# Patient Record
Sex: FEMALE | Race: WHITE | NOT HISPANIC OR LATINO | Employment: OTHER | URBAN - NONMETROPOLITAN AREA
[De-identification: names, ages, dates, MRNs, and addresses within clinical notes are randomized per-mention and may not be internally consistent; named-entity substitution may affect disease eponyms.]

---

## 2017-04-10 ENCOUNTER — HOSPITAL ENCOUNTER (OUTPATIENT)
Dept: RADIOLOGY | Facility: OTHER | Age: 77
End: 2017-04-10
Attending: FAMILY MEDICINE

## 2017-04-10 DIAGNOSIS — Z12.31 ENCOUNTER FOR SCREENING MAMMOGRAM FOR MALIGNANT NEOPLASM OF BREAST: ICD-10-CM

## 2017-05-24 ENCOUNTER — COMMUNICATION - GICH (OUTPATIENT)
Dept: SURGERY | Facility: OTHER | Age: 77
End: 2017-05-24

## 2017-06-02 ENCOUNTER — COMMUNICATION - GICH (OUTPATIENT)
Dept: SURGERY | Facility: OTHER | Age: 77
End: 2017-06-02

## 2017-06-02 DIAGNOSIS — Z86.0100 HISTORY OF COLONIC POLYPS: ICD-10-CM

## 2017-06-19 ENCOUNTER — HOSPITAL ENCOUNTER (OUTPATIENT)
Dept: SURGERY | Facility: OTHER | Age: 77
Discharge: HOME | End: 2017-06-19
Attending: SURGERY | Admitting: SURGERY

## 2017-06-19 ENCOUNTER — HISTORY (OUTPATIENT)
Dept: SURGERY | Facility: OTHER | Age: 77
End: 2017-06-19

## 2017-06-19 ENCOUNTER — COMMUNICATION - GICH (OUTPATIENT)
Dept: SURGERY | Facility: OTHER | Age: 77
End: 2017-06-19

## 2017-06-19 ENCOUNTER — SURGERY (OUTPATIENT)
Dept: SURGERY | Facility: OTHER | Age: 77
End: 2017-06-19

## 2017-12-28 NOTE — PROGRESS NOTES
Patient Information     Patient Name MRBethany Bernal [id number] Female 1940      Progress Notes by Casper Cutler MD at 2017 12:09 PM     Author:  Casper Cutler MD Service:  (none) Author Type:  Physician     Filed:  2017 12:10 PM Date of Service:  2017 12:09 PM Status:  Signed     :  Casper Cutler MD (Physician)            Screening Questions for the Scheduling of Screening Colonoscopies   (If Colonoscopy is diagnostic, Provider should review the chart before scheduling.)  Are you younger than 50 or older than 80?  NO   Do you take aspirin or fish oil?  ASPRIN  (if yes, tell patient to stop 1 week prior to Colonoscopy)  Do you take warfarin (Coumadin), clopidogrel (Plavix), apixaban (Eliquis), dabigatram (Pradaxa), rivaroxaban (Xarelto) or any blood thinner? NO   Do you use oxygen at home?  NO   Do you have kidney disease? NO   Are you on dialysis? NO   Have you had a stroke or heart attack in the last year? NO  Have you had a stent in your heart or any blood vessel in the last year? NO  Have you had a transplant of any organ? NO  Have you had a colonoscopy or upper endoscopy (EGD) before? YES          When?   - [facility]   Date of scheduled Colonoscopy. 2017  Provider EDUIN   Pharmacy [facility]     Last colonoscopy was 2012 , tubular adenoma polyps were found at that time

## 2017-12-28 NOTE — OR SURGEON
Patient Information     Patient Name MRN Bethany Vazquez [id number] Female 1940      OR Surgeon by Casper Cutler MD at 2017 10:32 AM     Author:  Casper Cutler MD Service:  (none) Author Type:  Physician     Filed:  2017 10:34 AM Date of Service:  2017 10:32 AM Status:  Signed     :  Casper Cutler MD (Physician)            PROCEDURE NOTE    DATE OF SERVICE: 2017    SURGEON: Casper Cutler MD    PRE-OP DIAGNOSIS:  History of Polyps    POST-OP DIAGNOSIS:  Sigmoid Diverticulosis    PROCEDURE: Colonoscopy  ANESTHESIA:  NICO Atwood CRNA    INDICATION FOR THE PROCEDURE: The patient is a 76 y.o. female with h/o polyps . The patient has no complaints  . After explaining the risks to include bleeding, perforation, potential inability to reach the cecum, the patient wished to proceed.    PROCEDURE:After adequate sedation, the patient was in the left lateral decubitus position.  Rectal exam was performed.  There was normal tone and no palpable masses  .  The colonoscope was introduced into the rectum and advanced to the cecum with mild difficulty.  The patient's prep was Excell[facility].  The terminal cecum was reached.  The cecum, ascending, transverse, descending and sigmoid colon was with sigmoid diverticulosis .  The scope was retroflexed in the rectum.  The rectum was unremarkable  .  The scope was straightened and removed.  The patient tolerated the procedure well.     ESTIMATED BLOOD LOSS: none    COMPLICATIONS:  None    TISSUE REMOVED:  no    RECOMMEND:  follow up N/A due to age, fiber supplement and given literature on diverticulosis      Casper Cutler MD FACS

## 2017-12-28 NOTE — OR ANESTHESIA
Patient Information     Patient Name MRN Sex Bethany Duong [id number] Female 1940      OR Anesthesia by Sobia Atwood CRNA at 2017  9:26 AM     Author:  Sobia Atwood CRNA Service:  (none) Author Type:  NURS- Nurse Anesthetist     Filed:  2017  9:26 AM Date of Service:  2017  9:26 AM Status:  Signed     :  Sobia Atwood CRNA (NURS- Nurse Anesthetist)                                                           ANESTHESIA ASSESSMENT    Date: 17 Time: 9:26 AM      Patient:  Bethany Hoyt    Procedure(s) (LRB):  COLONOSCOPY (N/A)    Past Medical History:     Diagnosis  Date     Breast cancer (HCC)     estrogen receptor borderline, progesterone receptor positive, treated with Tamoxifen 10 mg. b.i.d. times five years.  Status post chemotherapy times one and Tamoxifen times five years.        BREAST CANCER, PERSONAL HX 2011     Carpal tunnel syndrome, bilateral      COLONIC POLYPS, ADENOMATOUS, HX OF 3/27/2012     DEGENERATIVE JOINT DISEASE, RIGHT HIP 10/7/2011     Diverticulosis      DIVERTICULOSIS, SIGMOID COLON 3/26/2012     ECZEMA 2011     Finger mass, left 2013     OSTEOPENIA 2011     Pericarditis      S/P right mastectomy        Past Surgical History:      Procedure  Laterality Date     BACK SURGERY       CARPAL TUNNEL RELEASE      Right       CARPAL TUNNEL RELEASE  2010    Left       COLONOSCOPY SCREENING  2002    Diverticulosis       COLONOSCOPY SCREENING  3/26/2012    Follow up 2017       excision finger mass  13    left index finger, [facility]       MASTECTOMY      Right         Family History       Problem   Relation Age of Onset     Other  Father 85     Alzheimer's/Pneumonia       Cancer  Mother      Renal metastized to lung       Cancer-colon  Brother      Anesthesia Problem  No Family History      Blood Disease  No Family History      Stroke  No Family History      Heart Disease  No Family History      Diabetes   No Family History      Cancer-breast  No Family History        Patient Active Problem List     Diagnosis  Code     H/O adenomatous polyp of colon Z86.010     HYPERLIPIDEMIA E78.5     BREAST CANCER, PERSONAL HX Z85.3     OSTEOPENIA M89.9, M94.9     DEGENERATIVE JOINT DISEASE, RIGHT HIP M16.9     Subdeltoid bursitis M75.50     Prepatellar bursitis M70.40       Prescriptions Prior to Admission       Medication  Sig Dispense Refill     ascorbic acid (VITAMIN C) 500 mg tablet Take 500 mg by mouth once daily.       aspirin enteric coated 81 mg tablet Take 1 tablet by mouth once daily with a meal.  0     Cholecalciferol, Vitamin D3, (VITAMIN D-3) 5,000 unit Tab Take  by mouth 2 times daily.       MV,CA,MIN/IRON FUM/FA/LYCO/LUT (COMPLETE MULTI ORAL) Take  by mouth once daily.       Post Mastectomy Bra For personal use. 2 Packet 0     psyllium (METAMUCIL SMOOTH TEXTURE) powd Take 1 tsp by mouth once daily.  0       Allergies:No Known Allergies    Review of Systems:  GERD: No  Chest pain: No  Shortness of breath: No  Recent fever: No  Poor exercise tolerance: No  Bleeding tendency: No  Pregnant: No  Anesthesia Complications: None      History    Smoking Status      Never Smoker   Smokeless Tobacco      Never Used     Social History     Social History        Marital status:       Spouse name: N/A     Number of children:  N/A     Years of education:  N/A     Social History Main Topics       Smoking status: Never Smoker     Smokeless tobacco: Never Used     Alcohol use No     Drug use: No     Sexual activity: Not on file     Other Topics   Concern     Caffeine Concern  Yes     Coffee 1-2 cups/daily      Exercise  Yes     Walking      Seat Belt  Yes     %100      Social History Narrative     She is  (Mark).      She has three children-Walter, Kat, and Analia.      She has three grandchildren.    She is not employed.        Preload 8/23/2013       Physical Examination:  /70  Pulse 67  Temp 97.8  F (36.6   C)  Resp 16  SpO2 100%  Breastfeeding? No There is no height or weight on file to calculate BMI. There is no height or weight on file to calculate BSA.  Dental Condition: Good     Mallampati Score (Airway): II  Cardiovascular: Normal  Pulmonary: Normal  Other: (not recorded)    Recent Labs in [facility]:    No results for input(s): SODIUM, POTASSIUM, CHLORIDE, [id number], ANIONGAP, BUN, CREATININE, BUNCREARATIO, CALCIUM, GLUCOSE, GLUCOSEMETER, KETONES, MAGNESIUM, WBC, HGB, HCT, PLT, ABORH, RHTYPE, PREGURINE, BHCGQL, HCGBETAQUANT, INR in the last 72 hours.          Assessment/Plan:  ASA Class: II  Risk of dental injury discussed: Yes  NPO status confirmed: Yes  Anesthetic Plan: MAC  Risk/Benefit/Alt discussed: Yes  Questions answered: Yes  Emergency Case?: No  Labs/ECG/Radiology Reviewed?: Yes      H&P Reviewed.  Patient Examined.      Provider Electronic Signature:  Sobia Atwood CRNA

## 2017-12-28 NOTE — TELEPHONE ENCOUNTER
Patient Information     Patient Name MRBethany Bernal [id number] Female 1940      Telephone Encounter by Symone Frausto at 2017 10:57 AM     Author:  Symone Frausto Service:  (none) Author Type:  (none)     Filed:  2017 12:48 PM Encounter Date:  2017 Status:  Signed     :  Symone Frausto            Screening Questions for the Scheduling of Screening Colonoscopies   (If Colonoscopy is diagnostic, Provider should review the chart before scheduling.)  Are you younger than 50 or older than 80?  NO   Do you take aspirin or fish oil?  ASPRIN  (if yes, tell patient to stop 1 week prior to Colonoscopy)  Do you take warfarin (Coumadin), clopidogrel (Plavix), apixaban (Eliquis), dabigatram (Pradaxa), rivaroxaban (Xarelto) or any blood thinner? NO   Do you use oxygen at home?  NO   Do you have kidney disease? NO   Are you on dialysis? NO   Have you had a stroke or heart attack in the last year? NO  Have you had a stent in your heart or any blood vessel in the last year? NO  Have you had a transplant of any organ? NO  Have you had a colonoscopy or upper endoscopy (EGD) before? YES          When?   - [facility]   Date of scheduled Colonoscopy. 2017  Provider EDUIN   Pharmacy [facility]     Last colonoscopy was 2012 , tubular adenoma polyps were found at that time.

## 2017-12-28 NOTE — OR ANESTHESIA
Patient Information     Patient Name MRN Sex     Bethany Hoyt [id number] Female 1940      OR Anesthesia by Sobia Atwood CRNA at 2017 10:36 AM     Author:  Sobia Atwood CRNA Service:  (none) Author Type:  NURS- Nurse Anesthetist     Filed:  2017 10:36 AM Date of Service:  2017 10:36 AM Status:  Signed     :  Sobia Atwood CRNA (NURS- Nurse Anesthetist)            Anesthesia Post Operative Care Note    Name: Bethany Hoyt  MRN:   [id number]  :    1940       Procedure Done:  See Surgeon Note        Anesthesia Technique    Anesthetic Type:  MAC       MAC Type:  NC     Oral Trauma:  No    Intraoperative Course   Hemodynamics:  Stable    Ventilation Normal:  Yes Lung Sounds:  Normal      PACU Course    Airway Status:  Extubated     Nondepolarizer Used:       Reversed: N/A   Hemodynamics:  Stable      Hydration: Euvolemic   Temperature:  36.1 - 38.3      Mental Status:  Awake, alert, follows commands   Pain Management:  Adequate   Regional Block:  No   Anesthesia Complications:  None      Vital Signs:  Temp: 97.8  F (36.6  C)  Pulse: 67  BP: 119/70  Resp: 16  SpO2: 100 %                       Active Lines:  Patient Lines/Drains/Airways Status    Active Line     Name: Placement date: Placement time: Site: Days:    PERIPHERAL VAD Left Forearm 17   0945   Forearm   less than 1                Intake & Output:       Labs:  No results for input(s): SD0SEJIYFDL, [id number], PHARTERIAL, MGB1IWXNEBCQ, S4WTSEJFZKAV in the last 24 hours.    No results for input(s): MAGNESIUM in the last 24 hours.    No results for input(s): GLUCOSEMETER in the last 720 hours.        Sobia Atwood CRNA ....................  2017   10:36 AM

## 2017-12-28 NOTE — OR NURSING
Patient Information     Patient Name MRN Bethany Vazquez [id number] Female 1940      OR Nursing by Marbella Trejo RN at 2017 10:03 AM     Author:  Marbella Trejo RN Service:  (none) Author Type:  NURS- Registered Nurse     Filed:  2017 10:17 AM Date of Service:  2017 10:03 AM Status:  Signed     :  Marbella Trejo RN (NURS- Registered Nurse)            Handoff report received from Deysi Gruber RN prior to OR admit

## 2017-12-28 NOTE — OR POSTOP
Patient Information     Patient Name MRN Bethany Vazquez [id number] Female 1940      OR PostOp by Inocencia House RN at 2017 12:23 PM     Author:  Inocencia House RN Service:  (none) Author Type:  NURS- Registered Nurse     Filed:  2017 12:24 PM Date of Service:  2017 12:23 PM Status:  Signed     :  Inocencia House RN (NURS- Registered Nurse)            Discharge Note    Data:  Bethany Hoyt has been discharged home at 1135 via ambulatory accompanied by Registered Nurse.      Action:  Written discharge/follow-up instructions were provided to patient. Prescriptions : None.  Belongings sent with patient. Medications from home sent with patient/family: Not Applicable  Equipment none .     Response:  Patient verbalized understanding of discharge instructions, reason for discharge, and necessary follow-up appointments.

## 2017-12-29 NOTE — H&P
Patient Information     Patient Name MRN Bethany Vazquez [id number] Female 1940      H&P by Casper Cutler MD at 2017  9:28 AM     Author:  Casper Cutler MD Service:  (none) Author Type:  Physician     Filed:  2017  9:29 AM Date of Service:  2017  9:28 AM Status:  Signed     :  Casper Cutler MD (Physician)            History and Physical    CHIEF COMPLAINT / REASON FOR PROCEDURE:  H/o polyps    PERTINENT HISTORY   Patient is a 76 y.o. female who presents today for colonoscopy for h/o polyps.   Last colonoscopy .  Patient has no complaints.    Past Medical History:     Diagnosis  Date     Breast cancer (HCC)     estrogen receptor borderline, progesterone receptor positive, treated with Tamoxifen 10 mg. b.i.d. times five years.  Status post chemotherapy times one and Tamoxifen times five years.        BREAST CANCER, PERSONAL HX 2011     Carpal tunnel syndrome, bilateral      COLONIC POLYPS, ADENOMATOUS, HX OF 3/27/2012     DEGENERATIVE JOINT DISEASE, RIGHT HIP 10/7/2011     Diverticulosis      DIVERTICULOSIS, SIGMOID COLON 3/26/2012     ECZEMA 2011     Finger mass, left 2013     OSTEOPENIA 2011     Pericarditis      S/P right mastectomy      Past Surgical History:      Procedure  Laterality Date     BACK SURGERY       CARPAL TUNNEL RELEASE      Right       CARPAL TUNNEL RELEASE  2010    Left       COLONOSCOPY SCREENING  2002    Diverticulosis       COLONOSCOPY SCREENING  3/26/2012    Follow up 2017       excision finger mass  13    left index finger, [place]       MASTECTOMY      Right         Bleeding tendencies:  No    ALLERGIES/SENSITIVITIES: No Known Allergies     CURRENT MEDICATIONS:    No current facility-administered medications on file prior to encounter.      Current Outpatient Prescriptions on File Prior to Encounter       Medication  Sig Dispense Refill     ascorbic acid (VITAMIN C) 500 mg tablet Take 500 mg by mouth once  daily.       aspirin enteric coated 81 mg tablet Take 1 tablet by mouth once daily with a meal.  0     Cholecalciferol, Vitamin D3, (VITAMIN D-3) 5,000 unit Tab Take  by mouth 2 times daily.       MV,CA,MIN/IRON FUM/FA/LYCO/LUT (COMPLETE MULTI ORAL) Take  by mouth once daily.       Post Mastectomy Bra For personal use. 2 Packet 0     psyllium (METAMUCIL SMOOTH TEXTURE) powd Take 1 tsp by mouth once daily.  0       Physical Exam:   /70  Pulse 67  Temp 97.8  F (36.6  C)  Resp 16  SpO2 100%  Breastfeeding? No   EXAM:  Chest/Respiratory Exam: Normal.  Cardiovascular Exam: Normal.        PLAN:  Colonoscopy, Patient understands risks of bleeding, perforation, potential inability to reach cecum, aspiration and wishes to proceed.  MAC needed for age

## 2018-01-04 NOTE — PROGRESS NOTES
Patient Information     Patient Name MRN Bethany Vazquez [id number] Female 1940      Progress Notes by Sonia Rey at 4/10/2017  1:39 PM     Author:  Sonia Rey Service:  (none) Author Type:  Other Clinical Staff     Filed:  4/10/2017  1:39 PM Date of Service:  4/10/2017  1:39 PM Status:  Signed     :  Sonia Rey (Other Clinical Staff)            Falls Risk Criteria:    Age 65 and older or under age 4        Sensory deficits    Poor vision    Use of ambulatory aides    Impaired judgment    Unable to walk independently    Meets High Risk criteria for falls:  Yes             1.  Do you have dizziness or vertigo?    no                    2.  Do you need help standing or walking?   no                 3.  Have you fallen within the last 6 months?    no           4.  Has the patient been fasting?      no       If any risks are marked Yes, the following interventions are utilized:    Do not leave patient unattended     Assist patient in the dressing room and bathroom    Have ambulatory aides available throughout procedure    Involve patient s family if available

## 2018-01-26 ENCOUNTER — DOCUMENTATION ONLY (OUTPATIENT)
Dept: FAMILY MEDICINE | Facility: OTHER | Age: 78
End: 2018-01-26

## 2018-01-26 PROBLEM — K57.30 DIVERTICULOSIS OF SIGMOID COLON: Status: ACTIVE | Noted: 2017-06-19

## 2018-01-26 PROBLEM — E78.5 HYPERLIPIDEMIA: Status: ACTIVE | Noted: 2018-01-26

## 2018-01-26 RX ORDER — ASPIRIN 81 MG/1
81 TABLET ORAL
COMMUNITY
Start: 2013-09-16 | End: 2019-10-09

## 2018-01-26 RX ORDER — ASCORBIC ACID 500 MG
500 TABLET ORAL DAILY
COMMUNITY

## 2018-06-01 ENCOUNTER — OFFICE VISIT (OUTPATIENT)
Dept: FAMILY MEDICINE | Facility: OTHER | Age: 78
End: 2018-06-01
Attending: NURSE PRACTITIONER
Payer: COMMERCIAL

## 2018-06-01 VITALS
DIASTOLIC BLOOD PRESSURE: 80 MMHG | BODY MASS INDEX: 22.6 KG/M2 | SYSTOLIC BLOOD PRESSURE: 122 MMHG | WEIGHT: 135.8 LBS | HEART RATE: 62 BPM

## 2018-06-01 DIAGNOSIS — N89.8 VAGINAL LESION: Primary | ICD-10-CM

## 2018-06-01 PROCEDURE — 99213 OFFICE O/P EST LOW 20 MIN: CPT | Performed by: NURSE PRACTITIONER

## 2018-06-01 PROCEDURE — G0463 HOSPITAL OUTPT CLINIC VISIT: HCPCS

## 2018-06-01 ASSESSMENT — ANXIETY QUESTIONNAIRES
1. FEELING NERVOUS, ANXIOUS, OR ON EDGE: NOT AT ALL
2. NOT BEING ABLE TO STOP OR CONTROL WORRYING: NOT AT ALL

## 2018-06-01 ASSESSMENT — PAIN SCALES - GENERAL: PAINLEVEL: NO PAIN (0)

## 2018-06-01 NOTE — MR AVS SNAPSHOT
After Visit Summary   6/1/2018    Bethany Hoyt    MRN: [id number]           Patient Information     Date Of Birth          1940        Visit Information        Provider Department      6/1/2018 1:15 PM Karla Goldsmith CNP [facility]        Today's Diagnoses     Vaginal lesion    -  1       Follow-ups after your visit        Who to contact     If you have questions or need follow up information about today's clinic visit or your schedule please contact [facility] directly at [phone].  Normal or non-critical lab and imaging results will be communicated to you by MyChart, letter or phone within 4 business days after the clinic has received the results. If you do not hear from us within 7 days, please contact the clinic through MyChart or phone. If you have a critical or abnormal lab result, we will notify you by phone as soon as possible.  Submit refill requests through [employer] or call your pharmacy and they will forward the refill request to us. Please allow 3 business days for your refill to be completed.          Additional Information About Your Visit        Care EveryWhere ID     This is your Care EveryWhere ID. This could be used by other organizations to access your [facility] medical records  [id number]        Your Vitals Were     Pulse BMI (Body Mass Index)                62 22.6 kg/m2           Blood Pressure from Last 3 Encounters:   06/01/18 122/80   04/04/16 118/74   03/25/15 130/76    Weight from Last 3 Encounters:   06/01/18 135 lb 12.8 oz (61.6 kg)   04/04/16 135 lb (61.2 kg)   03/25/15 142 lb 3.2 oz (64.5 kg)              Today, you had the following     No orders found for display       Primary Care Provider Office Phone # Fax #    Olivier Cheung [phone] [address] [facility] [address]        Equal Access to Services     SCOTT SMITH : roque Santiago  benjamin poemaelliot julienpalnasreen zaratehugo jackyenzo bolden. So [facility] [phone].    ATENCIÓN: Si carter brito, tiene a vargas disposición servicios gratuitos de asistencia lingüística. Llame al [phone].    We comply with applicable federal civil rights laws and [place] laws. We do not discriminate on the basis of race, color, national origin, age, disability, sex, sexual orientation, or gender identity.            Thank you!     Thank you for choosing [facility]  for your care. Our goal is always to provide you with excellent care. Hearing back from our patients is one way we can continue to improve our services. Please take a few minutes to complete the written survey that you may receive in the mail after your visit with us. Thank you!             Your Updated Medication List - Protect others around you: Learn how to safely use, store and throw away your medicines at www.disposemymeds.org.          This list is accurate as of 6/1/18  2:24 PM.  Always use your most recent med list.                   Brand Name Dispense Instructions for use Diagnosis    ascorbic acid 500 MG tablet    VITAMIN C     Take 500 mg by mouth daily        aspirin 81 MG EC tablet      Take 81 mg by mouth daily with food        D 5000 5000 units Tabs   Generic drug:  Cholecalciferol      Take by mouth 2 times daily        PSYLLIUM PO      Take by mouth daily

## 2018-06-01 NOTE — PROGRESS NOTES
SUBJECTIVE:   Bethany Hoyt is a 77 year old female who presents to clinic today for the following health issues:      Vaginal Bleeding   Onset: This past week (a few days ago)    Description:   Duration of bleeding episodes: Has seen a dab of blood only a couple of times  Frequency between periods:  Postmenopausal  Describe bleeding/flow:   Clots: no  Number of pads/hour: 0  Cramping: None    Accompanying Signs & Symptoms:  Weakness: no  Lightheadedness: no  Hot flashes: no  Nosebleeds/Easy bruising: no  Vaginal Discharge: no    History:  No LMP recorded.  Previous normal periods: Postmenopausal  Contraceptive use: NO  Possibility of Pregnancy: no  Any bleeding after intercourse: Not sexually active  Age of first period (menarche): Uncertain  Abnormal PAP Smears: Denies history of abnormal PAPs    Precipitating factors: She is wondering if she could have wiped too hard.    Alleviating factors: none    Therapies Tried and outcome: None    Patient does not think that bleeding is coming vaginally but thinks that there is a tear. She only sees a dab of blood when wiping after voiding and it has only been a few times. She also feels a small lump in the area that she is presuming the bleeding is coming from. She does report a burning discomfort in the area. Denies dysuria, blood in urine, fever, chills. She is not sexually active anymore. She does have a history of breast cancer with right mastectomy. She still has cervix, uterus, fallopian tubes, and ovaries. She has had 3 pregnancy with 3 normal vaginal deliveries but she cannot recall how many required an episiotomy--she just recalls having it done at some point during child birth. Denies diarrhea but does have a history of constipation for which she takes fiber daily for and ensures she drinks plenty of water.      Problem list and histories reviewed & adjusted, as indicated.  Additional history: as documented    Patient Active Problem List   Diagnosis      Personal history of malignant neoplasm of breast     Osteoarthrosis, unspecified whether generalized or localized, pelvic region and thigh     Diverticulosis of sigmoid colon     H/O adenomatous polyp of colon     Hyperlipidemia     Disorder of bone and cartilage     Prepatellar bursitis     Subdeltoid bursitis     Past Surgical History:   Procedure Laterality Date     BACK SURGERY      1959     COLONOSCOPY      5/2002,Diverticulosis     COLONOSCOPY      3/26/2012,Follow up 2017     COLONOSCOPY      06/19/2017,F/U N/A     MASTECTOMY SIMPLE      1990,Right     OTHER SURGICAL HISTORY      9/30/13,[id number],OTHER,left index finger, [facility]     RELEASE CARPAL TUNNEL      2005,Right     RELEASE CARPAL TUNNEL      2010,Left       Social History   Substance Use Topics     Smoking status: Never Smoker     Smokeless tobacco: Never Used     Alcohol use No     Family History   Problem Relation Age of Onset     Other - See Comments Father 85     Alzheimer's/Pneumonia     CANCER Mother      Cancer,Renal metastized to lung     Colon Cancer Brother      Cancer-colon     Anesthesia Reaction No family hx of      Anesthesia Problem     Blood Disease No family hx of      Blood Disease     HEART DISEASE No family hx of      Heart Disease     DIABETES No family hx of      Diabetes     Breast Cancer No family hx of      Cancer-breast         Current Outpatient Prescriptions   Medication Sig Dispense Refill     ascorbic acid (VITAMIN C) 500 MG tablet Take 500 mg by mouth daily       aspirin EC 81 MG EC tablet Take 81 mg by mouth daily with food       Cholecalciferol (D 5000) 5000 UNITS TABS Take by mouth 2 times daily       PSYLLIUM PO Take by mouth daily       No Known Allergies  Recent Labs   Lab Test  04/04/16   1632  05/05/14   1100   LDL  124*  107   HDL  65  74   TRIG  106  67   CR  0.83  0.68   GFRESTBLACK  >60  >60   POTASSIUM  4.0  4.3      BP Readings from Last 3 Encounters:   06/01/18 122/80   04/04/16 118/74   03/25/15 130/76     Wt Readings from Last 3 Encounters:   06/01/18 135 lb 12.8 oz (61.6 kg)   04/04/16 135 lb (61.2 kg)   03/25/15 142 lb 3.2 oz (64.5 kg)                    Reviewed and updated as needed this visit by clinical staff       Reviewed and updated as needed this visit by Provider         ROS:  Constitutional, HEENT, cardiovascular, pulmonary, gi and gu systems are negative, except as otherwise noted.    OBJECTIVE:     /80 (BP Location: Right arm, Patient Position: Sitting, Cuff Size: Adult Large)  Pulse 62  Wt 135 lb 12.8 oz (61.6 kg)  BMI 22.6 kg/m2    GENERAL: healthy, alert and no distress  NECK: no adenopathy, no asymmetry, masses, or scars  RESP: lungs clear to auscultation - no rales, rhonchi or wheezes  CV: regular rate and rhythm, normal S1 S2, no S3 or S4, no murmur, click or rub, no peripheral edema  ABDOMEN: soft, nontender, no hepatosplenomegaly, no masses and bowel sounds normal. No CVA tenderness.   (female): normal female external genitalia except for left lateral episiotomy scar that has a small, raised, flesh colored, firm nodule of scar tissue medially and on distal end of scar tissue nodule is a small about 2 mm oval shaped lesion with pink, shiny granulation tissue, normal urethral meatus, vaginal mucosa pink, moist, smooth, unable to visualize cervix as patient verbalized discomfort with opening and manipulation of speculum while trying to visualize posterior cervix.   NEURO: Normal strength and tone, mentation intact and speech normal  PSYCH: mentation appears normal, affect normal/bright      ASSESSMENT/PLAN:     1. Vaginal lesion  Given patient's history and exam findings the lesion appears to be due to being irritated. Recommended she wipe doing more of a dabbing motion than a wiping motion to help lessen irritation since scar tissue does not stretch well with wiping. Also recommended that she try witch hazel/tucks pads if feeling burning sensation/soreness. Reassured her that there was  no bleeding seen vaginally and that given small dabs of bleeding as she describes it is most likely from the lesion.             FUTURE APPOINTMENTS:       - Follow-up visit in 2-4 weeks if no improvement in symptoms.      Karla Goldsmith CNP  [facility] AND [facility]

## 2018-06-01 NOTE — NURSING NOTE
Patient presents to the clinic for vaginal bleeding. Patient states there isn't a lot and that it started about a week ago, patient is wondering if there might be a tear or lump down in the vaginal area that could be irritated.  Sadi Joaquin ..............6/1/2018 12:50 PM

## 2018-06-26 ENCOUNTER — TRANSFERRED RECORDS (OUTPATIENT)
Dept: HEALTH INFORMATION MANAGEMENT | Facility: OTHER | Age: 78
End: 2018-06-26

## 2018-07-23 NOTE — PROGRESS NOTES
Patient Information     Patient Name  Bethany Hoyt MRN  [id number] Sex  Female   1940      Letter by Casper Cutler MD at      Author:  Casper Cutler MD Service:  (none) Author Type:  (none)    Filed:   Encounter Date:  2017 Status:  (Other)           Bethany Hoyt   [address]  [facility] [address]          2017    Dear Ms. Hoyt:    This letter is in regards to your colonoscopy that was performed by Casper Cutler MD on 17.  Diverticulosis was found during your colonoscopy. Dr. Casper Cutler MD  recommends a high fiber diet. A fiber supplement such as Metamucil, FiberCon, or Citrucel is recommended. Generic forms of these supplements are fine. These are available over the counter.   There were no polyps or other abnormalities found.  No follow up needed due to age being greater than 80 at the next screening interval unless you have problems.  If you have any questions regarding this colonoscopy or your results, please call the Surgery department at [phone]  A copy of your colonoscopy report will be placed in your electronic chart for your primary care provider's review.    Sincerely,        General Surgery    Reviewed and electronically signed by provider.

## 2018-07-24 NOTE — PROGRESS NOTES
Patient Information     Patient Name  Bethany Hoyt MRN  [id number] Sex  Female   1940      Letter by Casper Cutler MD at      Author:  Casper Cutler MD Service:  (none) Author Type:  (none)    Filed:   Encounter Date:  2017 Status:  (Other)           Bethany Hoyt   [address]  [facility] [address]          May 24, 2017    Dear Ms. Hoyt:    It has come to our attention that you are due for a colonoscopy.  According to our records, your last colonoscopy was done on Casper Cutler MD by 3/26/12.  It  is time for your repeat colonoscopy.  Please contact the Surgery department at [phone] and we will be happy to set up this colonoscopy for you.  If you have had a colonoscopy since your last one with us, please let us know so we can update our records.      Sincerely,       Angela Doherty  General Surgery      Reviewed and electronically signed by provider.

## 2019-03-11 ENCOUNTER — TRANSFERRED RECORDS (OUTPATIENT)
Dept: HEALTH INFORMATION MANAGEMENT | Facility: OTHER | Age: 79
End: 2019-03-11

## 2019-10-09 ENCOUNTER — OFFICE VISIT (OUTPATIENT)
Dept: FAMILY MEDICINE | Facility: OTHER | Age: 79
End: 2019-10-09
Attending: NURSE PRACTITIONER
Payer: COMMERCIAL

## 2019-10-09 VITALS
BODY MASS INDEX: 21.69 KG/M2 | HEIGHT: 65 IN | TEMPERATURE: 98 F | SYSTOLIC BLOOD PRESSURE: 138 MMHG | DIASTOLIC BLOOD PRESSURE: 74 MMHG | OXYGEN SATURATION: 96 % | WEIGHT: 130.2 LBS | RESPIRATION RATE: 20 BRPM | HEART RATE: 69 BPM

## 2019-10-09 DIAGNOSIS — E78.5 HYPERLIPIDEMIA, UNSPECIFIED HYPERLIPIDEMIA TYPE: ICD-10-CM

## 2019-10-09 DIAGNOSIS — R10.13 ABDOMINAL PAIN, EPIGASTRIC: Primary | ICD-10-CM

## 2019-10-09 DIAGNOSIS — Z13.29 SCREENING FOR THYROID DISORDER: ICD-10-CM

## 2019-10-09 LAB
ALBUMIN SERPL-MCNC: 4.3 G/DL (ref 3.5–5.7)
ALP SERPL-CCNC: 57 U/L (ref 34–104)
ALT SERPL W P-5'-P-CCNC: 10 U/L (ref 7–52)
ANION GAP SERPL CALCULATED.3IONS-SCNC: 6 MMOL/L (ref 3–14)
AST SERPL W P-5'-P-CCNC: 15 U/L (ref 13–39)
BILIRUB SERPL-MCNC: 0.4 MG/DL (ref 0.3–1)
BUN SERPL-MCNC: 16 MG/DL (ref 7–25)
CALCIUM SERPL-MCNC: 9.5 MG/DL (ref 8.6–10.3)
CHLORIDE SERPL-SCNC: 103 MMOL/L (ref 98–107)
CHOLEST SERPL-MCNC: 215 MG/DL
CO2 SERPL-SCNC: 31 MMOL/L (ref 21–31)
CREAT SERPL-MCNC: 0.73 MG/DL (ref 0.6–1.2)
ERYTHROCYTE [DISTWIDTH] IN BLOOD BY AUTOMATED COUNT: 13.2 % (ref 10–15)
GFR SERPL CREATININE-BSD FRML MDRD: 77 ML/MIN/{1.73_M2}
GLUCOSE SERPL-MCNC: 94 MG/DL (ref 70–105)
HCT VFR BLD AUTO: 42.5 % (ref 35–47)
HDLC SERPL-MCNC: 85 MG/DL (ref 23–92)
HGB BLD-MCNC: 13.6 G/DL (ref 11.7–15.7)
LDLC SERPL CALC-MCNC: 118 MG/DL
MCH RBC QN AUTO: 32.2 PG (ref 26.5–33)
MCHC RBC AUTO-ENTMCNC: 32 G/DL (ref 31.5–36.5)
MCV RBC AUTO: 101 FL (ref 78–100)
NONHDLC SERPL-MCNC: 130 MG/DL
PLATELET # BLD AUTO: 184 10E9/L (ref 150–450)
POTASSIUM SERPL-SCNC: 4 MMOL/L (ref 3.5–5.1)
PROT SERPL-MCNC: 7.4 G/DL (ref 6.4–8.9)
RBC # BLD AUTO: 4.22 10E12/L (ref 3.8–5.2)
SODIUM SERPL-SCNC: 140 MMOL/L (ref 134–144)
TRIGL SERPL-MCNC: 61 MG/DL
TSH SERPL DL<=0.05 MIU/L-ACNC: 2.96 IU/ML (ref 0.34–5.6)
WBC # BLD AUTO: 3.2 10E9/L (ref 4–11)

## 2019-10-09 PROCEDURE — 80053 COMPREHEN METABOLIC PANEL: CPT | Mod: ZL | Performed by: NURSE PRACTITIONER

## 2019-10-09 PROCEDURE — 36415 COLL VENOUS BLD VENIPUNCTURE: CPT | Mod: ZL | Performed by: NURSE PRACTITIONER

## 2019-10-09 PROCEDURE — 84443 ASSAY THYROID STIM HORMONE: CPT | Mod: ZL | Performed by: NURSE PRACTITIONER

## 2019-10-09 PROCEDURE — 85027 COMPLETE CBC AUTOMATED: CPT | Mod: ZL | Performed by: NURSE PRACTITIONER

## 2019-10-09 PROCEDURE — G0463 HOSPITAL OUTPT CLINIC VISIT: HCPCS

## 2019-10-09 PROCEDURE — 80061 LIPID PANEL: CPT | Mod: ZL | Performed by: NURSE PRACTITIONER

## 2019-10-09 PROCEDURE — 99214 OFFICE O/P EST MOD 30 MIN: CPT | Performed by: NURSE PRACTITIONER

## 2019-10-09 RX ORDER — OMEGA-3 FATTY ACIDS/FISH OIL 300-1000MG
400 CAPSULE ORAL AT BEDTIME
COMMUNITY
Start: 2019-04-18

## 2019-10-09 ASSESSMENT — MIFFLIN-ST. JEOR: SCORE: 1071.46

## 2019-10-09 ASSESSMENT — PAIN SCALES - GENERAL: PAINLEVEL: MILD PAIN (2)

## 2019-10-09 NOTE — NURSING NOTE
"Chief Complaint   Patient presents with     Abdominal Pain     x 1 week     Pt states she has had abdominal pain for the last week and her children wanted her to be seen.     Initial /74   Pulse 69   Temp 98  F (36.7  C) (Temporal)   Resp 20   Ht 1.651 m (5' 5\")   Wt 59.1 kg (130 lb 3.2 oz)   SpO2 96%   BMI 21.67 kg/m   Estimated body mass index is 21.67 kg/m  as calculated from the following:    Height as of this encounter: 1.651 m (5' 5\").    Weight as of this encounter: 59.1 kg (130 lb 3.2 oz).  Medication Reconciliation: complete    Deepti Lee LPN  "

## 2019-10-09 NOTE — PROGRESS NOTES
Subjective     Bethany Hoyt is a 78 year old female who presents to clinic today for the following health issues:    HPI   ABDOMINAL   PAIN     Onset: 1 week    Description:   Character: Gnawing  Location: epigastric region  Radiation: None    Intensity: mild    Progression of Symptoms:  same, constant and waxing and waning    Accompanying Signs & Symptoms:  Fever/Chills?: no   Gas/Bloating: YES  Nausea: no   Vomitting: no   Diarrhea?: no   Constipation:no   Dysuria or Hematuria: no    History:   Trauma: no   Previous similar pain: no    Previous tests done: none    Precipitating factors:   Does the pain change with:     Food: no      BM: no     Urination: no     Alleviating factors:  nothing    Therapies Tried and outcome: increased water    LMP:  not applicable       Patient Active Problem List   Diagnosis     Personal history of malignant neoplasm of breast     Osteoarthrosis, unspecified whether generalized or localized, pelvic region and thigh     Diverticulosis of sigmoid colon     H/O adenomatous polyp of colon     Hyperlipidemia     Disorder of bone and cartilage     Prepatellar bursitis     Subdeltoid bursitis     Past Surgical History:   Procedure Laterality Date     BACK SURGERY      1959     COLONOSCOPY      5/2002,Diverticulosis     COLONOSCOPY      3/26/2012,Follow up 2017     COLONOSCOPY      06/19/2017,F/U N/A     MASTECTOMY SIMPLE      1990,Right     OTHER SURGICAL HISTORY      9/30/13,[id number],OTHER,left index finger, [facility]     RELEASE CARPAL TUNNEL      2005,Right     RELEASE CARPAL TUNNEL      2010,Left       Social History     Tobacco Use     Smoking status: Never Smoker     Smokeless tobacco: Never Used   Substance Use Topics     Alcohol use: No     Family History   Problem Relation Age of Onset     Other - See Comments Father 85        Alzheimer's/Pneumonia     Cancer Mother         Cancer,Renal metastized to lung     Colon Cancer Brother         Cancer-colon     Anesthesia Reaction No  "family hx of         Anesthesia Problem     Blood Disease No family hx of         Blood Disease     Heart Disease No family hx of         Heart Disease     Diabetes No family hx of         Diabetes     Breast Cancer No family hx of         Cancer-breast         Current Outpatient Medications   Medication Sig Dispense Refill     ascorbic acid (VITAMIN C) 500 MG tablet Take 500 mg by mouth daily       Cholecalciferol (D 5000) 5000 UNITS TABS Take by mouth 2 times daily       PSYLLIUM PO Take by mouth daily       Denture Care Products (EFFERDENT DENTURE CLEANSER) TBEF 1 tablet by Other route Twice a month       ibuprofen (ADVIL/MOTRIN) 200 MG capsule Take 200 mg by mouth At Bedtime       Multiple Vitamins-Minerals (MULTI COMPLETE PO) Take 1 tablet by mouth daily       No Known Allergies    Reviewed and updated as needed this visit by Provider  Tobacco  Allergies  Meds  Problems  Med Hx  Surg Hx  Fam Hx  Soc Hx          Review of Systems   ROS COMP: As above      Objective    /74   Pulse 69   Temp 98  F (36.7  C) (Temporal)   Resp 20   Ht 1.651 m (5' 5\")   Wt 59.1 kg (130 lb 3.2 oz)   SpO2 96%   BMI 21.67 kg/m    Body mass index is 21.67 kg/m .  Physical Exam   GENERAL: healthy, alert and no distress  NECK: no adenopathy, no asymmetry, masses, or scars and thyroid normal to palpation  RESP: lungs clear to auscultation - no rales, rhonchi or wheezes  CV: regular rate and rhythm, normal S1 S2, no S3 or S4, no murmur, click or rub, no peripheral edema and peripheral pulses strong  ABDOMEN: soft, nontender, no hepatosplenomegaly, no masses and bowel sounds normal  MS: no gross musculoskeletal defects noted, no edema  SKIN: no suspicious lesions or rashes  NEURO: Normal strength and tone, mentation intact and speech normal  PSYCH: mentation appears normal, affect normal/bright    Diagnostic Test Results:  Labs reviewed in Epic  Results for orders placed or performed in visit on 10/09/19   US Abdomen " Complete    Narrative    PROCEDURES: US ABDOMEN COMPLETE    HISTORY: . Abdominal pain, epigastric.    TECHNIQUE: Grayscale ultrasound of the upper abdomen was performed.    COMPARISON: None.     FINDINGS:    LIVER: The liver is normal in size and echogenicity. The echotexture  is smooth. There is no intrahepatic mass or biliary ductal dilatation.  There is hepatopedal flow in the main portal vein.    GALLBLADDER: The gallbladder is unremarkable, without cholelithiasis,  wall thickening or pericholecystic fluid. No sonographic Gonzalez's sign  was elicited. There is no extrahepatic biliary ductal dilatation. The  common bile duct measures 3 mm.    ABDOMINAL AORTA AND IVC: Portions of the superior IVC and abdominal  aorta are visualized.    PANCREAS:The visualized portions of the pancreas are unremarkable.    SPLEEN: The spleen is normal in size.    KIDNEYS: Survey sagittal images show no collecting system dilatation.    OTHER: There is no free fluid in the upper abdomen.      Impression    IMPRESSION:     No finding to account for acute epigastric pain.    VERÓNICA DOOLEY MD   Lipid Profile   Result Value Ref Range    Cholesterol 215 (H) <200 mg/dL    Triglycerides 61 <150 mg/dL    HDL Cholesterol 85 23 - 92 mg/dL    LDL Cholesterol Calculated 118 (H) <100 mg/dL    Non HDL Cholesterol 130 (H) <130 mg/dL   Thyrotropin GH   Result Value Ref Range    Thyrotropin 2.96 0.34 - 5.60 IU/mL   CBC W PLT No Diff   Result Value Ref Range    WBC 3.2 (L) 4.0 - 11.0 10e9/L    RBC Count 4.22 3.8 - 5.2 10e12/L    Hemoglobin 13.6 11.7 - 15.7 g/dL    Hematocrit 42.5 35.0 - 47.0 %     (H) 78 - 100 fl    MCH 32.2 26.5 - 33.0 pg    MCHC 32.0 31.5 - 36.5 g/dL    RDW 13.2 10.0 - 15.0 %    Platelet Count 184 150 - 450 10e9/L   Comprehensive Metabolic Panel   Result Value Ref Range    Sodium 140 134 - 144 mmol/L    Potassium 4.0 3.5 - 5.1 mmol/L    Chloride 103 98 - 107 mmol/L    Carbon Dioxide 31 21 - 31 mmol/L    Anion Gap 6 3 - 14  mmol/L    Glucose 94 70 - 105 mg/dL    Urea Nitrogen 16 7 - 25 mg/dL    Creatinine 0.73 0.60 - 1.20 mg/dL    GFR Estimate 77 >60 mL/min/[1.73_m2]    GFR Estimate If Black >90 >60 mL/min/[1.73_m2]    Calcium 9.5 8.6 - 10.3 mg/dL    Bilirubin Total 0.4 0.3 - 1.0 mg/dL    Albumin 4.3 3.5 - 5.7 g/dL    Protein Total 7.4 6.4 - 8.9 g/dL    Alkaline Phosphatase 57 34 - 104 U/L    ALT 10 7 - 52 U/L    AST 15 13 - 39 U/L     PROCEDURES: US ABDOMEN COMPLETE     HISTORY: . Abdominal pain, epigastric.     TECHNIQUE: Grayscale ultrasound of the upper abdomen was performed.     COMPARISON: None.      FINDINGS:     LIVER: The liver is normal in size and echogenicity. The echotexture  is smooth. There is no intrahepatic mass or biliary ductal dilatation.  There is hepatopedal flow in the main portal vein.     GALLBLADDER: The gallbladder is unremarkable, without cholelithiasis,  wall thickening or pericholecystic fluid. No sonographic Gonzalez's sign  was elicited. There is no extrahepatic biliary ductal dilatation. The  common bile duct measures 3 mm.     ABDOMINAL AORTA AND IVC: Portions of the superior IVC and abdominal  aorta are visualized.     PANCREAS:The visualized portions of the pancreas are unremarkable.     SPLEEN: The spleen is normal in size.     KIDNEYS: Survey sagittal images show no collecting system dilatation.     OTHER: There is no free fluid in the upper abdomen.                                                                      IMPRESSION:      No finding to account for acute epigastric pain.     VERÓNICA DOOLEY MD        Assessment & Plan     1. Abdominal pain, epigastric  Ongoing mild gnawing epigastric pain for the past week. Does not change with food, urination or bowel movements. Physical unremarkable, no tenderness to palpation. Labs and US as above. May try Tums or Pepto-Bismol for relief. If worsening, return to clinic.   - CBC W PLT No Diff; Future  - Comprehensive Metabolic Panel; Future  - US  Abdomen Complete; Future  - US Abdomen Complete  - CBC W PLT No Diff  - Comprehensive Metabolic Panel    2. Hyperlipidemia, unspecified hyperlipidemia type  3. Screening for thyroid disorder  Has an appt tomorrow for medicare wellness check, will obtain today as she is afraid of needles.   - Lipid Profile; Future  - Lipid Profile  - Thyrotropin GH; Future  - Thyrotropin GH       Eugenie Maloney NP  [facility] AND [facility]

## 2019-10-10 ENCOUNTER — OFFICE VISIT (OUTPATIENT)
Dept: FAMILY MEDICINE | Facility: OTHER | Age: 79
End: 2019-10-10
Payer: COMMERCIAL

## 2019-10-10 ENCOUNTER — HOSPITAL ENCOUNTER (OUTPATIENT)
Dept: ULTRASOUND IMAGING | Facility: OTHER | Age: 79
Discharge: HOME | End: 2019-10-10
Attending: NURSE PRACTITIONER | Admitting: NURSE PRACTITIONER
Payer: COMMERCIAL

## 2019-10-10 VITALS
HEART RATE: 66 BPM | SYSTOLIC BLOOD PRESSURE: 124 MMHG | HEIGHT: 64 IN | WEIGHT: 129.38 LBS | TEMPERATURE: 98.4 F | DIASTOLIC BLOOD PRESSURE: 66 MMHG | OXYGEN SATURATION: 97 % | RESPIRATION RATE: 18 BRPM | BODY MASS INDEX: 22.09 KG/M2

## 2019-10-10 DIAGNOSIS — Z00.00 ENCOUNTER FOR MEDICARE ANNUAL WELLNESS EXAM: Primary | ICD-10-CM

## 2019-10-10 DIAGNOSIS — Z12.31 ENCOUNTER FOR SCREENING MAMMOGRAM FOR BREAST CANCER: ICD-10-CM

## 2019-10-10 PROCEDURE — 76700 US EXAM ABDOM COMPLETE: CPT

## 2019-10-10 PROCEDURE — G0439 PPPS, SUBSEQ VISIT: HCPCS | Performed by: NURSE PRACTITIONER

## 2019-10-10 PROCEDURE — G0463 HOSPITAL OUTPT CLINIC VISIT: HCPCS

## 2019-10-10 ASSESSMENT — PATIENT HEALTH QUESTIONNAIRE - PHQ9
SUM OF ALL RESPONSES TO PHQ QUESTIONS 1-9: 0
5. POOR APPETITE OR OVEREATING: NOT AT ALL

## 2019-10-10 ASSESSMENT — MIFFLIN-ST. JEOR: SCORE: 1051.84

## 2019-10-10 ASSESSMENT — ANXIETY QUESTIONNAIRES
7. FEELING AFRAID AS IF SOMETHING AWFUL MIGHT HAPPEN: NOT AT ALL
6. BECOMING EASILY ANNOYED OR IRRITABLE: NOT AT ALL
2. NOT BEING ABLE TO STOP OR CONTROL WORRYING: NOT AT ALL
GAD7 TOTAL SCORE: 0
1. FEELING NERVOUS, ANXIOUS, OR ON EDGE: NOT AT ALL
5. BEING SO RESTLESS THAT IT IS HARD TO SIT STILL: NOT AT ALL
IF YOU CHECKED OFF ANY PROBLEMS ON THIS QUESTIONNAIRE, HOW DIFFICULT HAVE THESE PROBLEMS MADE IT FOR YOU TO DO YOUR WORK, TAKE CARE OF THINGS AT HOME, OR GET ALONG WITH OTHER PEOPLE: NOT DIFFICULT AT ALL
3. WORRYING TOO MUCH ABOUT DIFFERENT THINGS: NOT AT ALL

## 2019-10-10 ASSESSMENT — PAIN SCALES - GENERAL: PAINLEVEL: MODERATE PAIN (5)

## 2019-10-10 NOTE — PATIENT INSTRUCTIONS
Patient Education   Personalized Prevention Plan  You are due for the preventive services outlined below.  Your care team is available to assist you in scheduling these services.  If you have already completed any of these items, please share that information with your care team to update in your medical record.  Health Maintenance Due   Topic Date Due     Osteoporosis Screening  1940     Discuss Advance Care Planning  1940     Annual Wellness Visit  11/08/2005     Zoster (Shingles) Vaccine (2 of 3) 10/12/2009

## 2019-10-10 NOTE — NURSING NOTE
Patient presents to clinic for annual Medicare Wellness visit.    Medication Reconciliation: complete    Jeanette Cruz LPN

## 2019-10-10 NOTE — PROGRESS NOTES
SUBJECTIVE:   Bethany Hoyt is a 78 year old female who presents for Preventive Visit.  Are you in the first 12 months of your Medicare coverage?  No    HPI  Do you feel safe in your environment? Yes    Do you have a Health Care Directive? No: Advance care planning reviewed with patient; information given to patient to review.    Hearing Assessment: normal, has bilateral hearing aids  Fall risk  Fallen 2 or more times in the past year?: No  Any fall with injury in the past year?: No    Cognitive Screening   1) Repeat 3 items (Leader, Season, Table)    2) Clock draw: NORMAL  3) 3 item recall: Recalls 3 objects  Results: 3 items recalled: COGNITIVE IMPAIRMENT LESS LIKELY    Mini-CogTM Copyright S Lopez. Licensed by the author for use in [facility] Relative[employer]ai; reprinted with permission (david@[facility]). All rights reserved.      Do you have sleep apnea, excessive snoring or daytime drowsiness?: yes    Reviewed and updated as needed this visit by clinical staff  Tobacco  Allergies  Meds  Med Hx  Surg Hx  Fam Hx  Soc Hx        Reviewed and updated as needed this visit by Provider        Social History     Tobacco Use     Smoking status: Never Smoker     Smokeless tobacco: Never Used   Substance Use Topics     Alcohol use: No     If you drink alcohol do you typically have >3 drinks per day or >7 drinks per week? No    No flowsheet data found.    Current providers sharing in care for this patient include:     Patient Care Team:  No Ref-Primary, Physician as PCP - General    The following health maintenance items are reviewed in Epic and correct as of today:  Health Maintenance   Topic Date Due     DEXA  1940     ADVANCE CARE PLANNING  1940     MEDICARE ANNUAL WELLNESS VISIT  11/08/2005     ZOSTER IMMUNIZATION (2 of 3) 10/12/2009     FALL RISK ASSESSMENT  10/09/2020     DTAP/TDAP/TD IMMUNIZATION (2 - Td) 05/06/2024     LIPID  10/09/2024     PHQ-2  Completed     INFLUENZA VACCINE  Completed      "PNEUMOCOCCAL IMMUNIZATION 65+ LOW/MEDIUM RISK  Completed     IPV IMMUNIZATION  Aged Out     MENINGITIS IMMUNIZATION  Aged Out     Mammogram Screening: Patient over age 75, has elected to continue with mammography screening.    Review of Systems  Constitutional, HEENT, cardiovascular, pulmonary, gi and gu systems are negative, except as otherwise noted.    OBJECTIVE:   /66 (BP Location: Right arm, Patient Position: Sitting, Cuff Size: Adult Regular)   Pulse 66   Temp 98.4  F (36.9  C) (Tympanic)   Resp 18   Ht 1.626 m (5' 4\")   Wt 58.7 kg (129 lb 6 oz)   LMP  (LMP Unknown)   SpO2 97%   Breastfeeding? No   BMI 22.21 kg/m   Estimated body mass index is 22.21 kg/m  as calculated from the following:    Height as of this encounter: 1.626 m (5' 4\").    Weight as of this encounter: 58.7 kg (129 lb 6 oz).  Physical Exam  GENERAL: healthy, alert and no distress  EYES: Eyes grossly normal to inspection, PERRL and conjunctivae and sclerae normal  HENT: ear canals and TM's normal, nose and mouth without ulcers or lesions  NECK: no adenopathy, no asymmetry, masses, or scars and thyroid normal to palpation  RESP: lungs clear to auscultation - no rales, rhonchi or wheezes  CV: regular rate and rhythm, normal S1 S2, no S3 or S4, no murmur, click or rub, no peripheral edema and peripheral pulses strong  MS: no gross musculoskeletal defects noted, no edema  SKIN: no suspicious lesions or rashes  NEURO: Normal strength and tone, mentation intact and speech normal  PSYCH: mentation appears normal, affect normal/bright    Diagnostic Test Results:  none     ASSESSMENT / PLAN:   1. Encounter for Medicare annual wellness exam  Patient is healthy individual that is not on any chronic medications.   She would like to still complete mammograms, as she has breast cancer history. Last completed in 2017 was normal.   Information given on advanced care directives.   Patient has no falls. She has grab bars in the shower. They do live " "on a [place] home. No concerns at this time.     End of Life Planning:  Patient currently has an advanced directive: No.  I have verified the patient's ablity to prepare an advanced directive/make health care decisions.  Literature was provided to assist patient in preparing an advanced directive.    COUNSELING:  Reviewed preventive health counseling, as reflected in patient instructions       Vision screening       Hearing screening       Dental care       Fall risk prevention       Advanced Planning     Estimated body mass index is 22.21 kg/m  as calculated from the following:    Height as of this encounter: 1.626 m (5' 4\").    Weight as of this encounter: 58.7 kg (129 lb 6 oz).     reports that she has never smoked. She has never used smokeless tobacco.      Appropriate preventive services were discussed with this patient, including applicable screening as appropriate for cardiovascular disease, diabetes, osteopenia/osteoporosis, and glaucoma.  As appropriate for age/gender, discussed screening for colorectal cancer, prostate cancer, breast cancer, and cervical cancer. Checklist reviewing preventive services available has been given to the patient.    Reviewed patients plan of care and provided an AVS. The Basic Care Plan (routine screening as documented in Health Maintenance) for Bethany meets the Care Plan requirement. This Care Plan has been established and reviewed with the Patient.    Counseling Resources:  ATP IV Guidelines  Pooled Cohorts Equation Calculator  Breast Cancer Risk Calculator  FRAX Risk Assessment  ICSI Preventive Guidelines  Dietary Guidelines for Americans, 2010  USDA's MyPlate  ASA Prophylaxis  Lung CA Screening    Jovanna Chapman NP  [facility] AND [facility] [place]    Identified Health Risks:  "

## 2019-10-11 ASSESSMENT — ANXIETY QUESTIONNAIRES: GAD7 TOTAL SCORE: 0

## 2020-09-03 ENCOUNTER — HOSPITAL ENCOUNTER (OUTPATIENT)
Dept: GENERAL RADIOLOGY | Facility: OTHER | Age: 80
End: 2020-09-03
Attending: FAMILY MEDICINE
Payer: COMMERCIAL

## 2020-09-03 ENCOUNTER — OFFICE VISIT (OUTPATIENT)
Dept: FAMILY MEDICINE | Facility: OTHER | Age: 80
End: 2020-09-03
Attending: FAMILY MEDICINE
Payer: COMMERCIAL

## 2020-09-03 VITALS
BODY MASS INDEX: 22.16 KG/M2 | TEMPERATURE: 98.7 F | RESPIRATION RATE: 16 BRPM | HEIGHT: 65 IN | WEIGHT: 133 LBS | SYSTOLIC BLOOD PRESSURE: 130 MMHG | DIASTOLIC BLOOD PRESSURE: 76 MMHG | HEART RATE: 80 BPM | OXYGEN SATURATION: 96 %

## 2020-09-03 DIAGNOSIS — M16.12 ARTHRITIS OF LEFT HIP: Primary | ICD-10-CM

## 2020-09-03 DIAGNOSIS — M54.50 CHRONIC LEFT-SIDED LOW BACK PAIN WITHOUT SCIATICA: ICD-10-CM

## 2020-09-03 DIAGNOSIS — G89.29 CHRONIC LEFT-SIDED LOW BACK PAIN WITHOUT SCIATICA: ICD-10-CM

## 2020-09-03 DIAGNOSIS — M25.552 HIP PAIN, LEFT: ICD-10-CM

## 2020-09-03 PROCEDURE — G0463 HOSPITAL OUTPT CLINIC VISIT: HCPCS

## 2020-09-03 PROCEDURE — 99214 OFFICE O/P EST MOD 30 MIN: CPT | Performed by: FAMILY MEDICINE

## 2020-09-03 PROCEDURE — 73523 X-RAY EXAM HIPS BI 5/> VIEWS: CPT

## 2020-09-03 PROCEDURE — G0463 HOSPITAL OUTPT CLINIC VISIT: HCPCS | Mod: 25

## 2020-09-03 PROCEDURE — 72100 X-RAY EXAM L-S SPINE 2/3 VWS: CPT

## 2020-09-03 ASSESSMENT — PAIN SCALES - GENERAL: PAINLEVEL: MODERATE PAIN (5)

## 2020-09-03 ASSESSMENT — MIFFLIN-ST. JEOR: SCORE: 1079.16

## 2020-09-03 NOTE — PATIENT INSTRUCTIONS
Ibuprofen max dose: 800mg every 8 hours as needed  Tylenol max dose: 1000mg every 6 hours as needed

## 2020-09-03 NOTE — NURSING NOTE
Chief Complaint   Patient presents with     Establish Care     No questions or concerns other than her L hip. Has been going to the Chiropractor. Pain is worse when sleeping. Mammogram and Colonoscopy done in 2017.    Medication Reconciliation: complete    Rosa Terry LPN

## 2020-09-03 NOTE — PROGRESS NOTES
"Nursing Notes:   Rosa Terry LPN  9/3/2020  2:38 PM  Signed  Chief Complaint   Patient presents with     Establish Care     No questions or concerns other than her L hip. Has been going to the Chiropractor. Pain is worse when sleeping. Mammogram and Colonoscopy done in 2017.    Medication Reconciliation: complete    Rosa Terry LPN       SUBJECTIVE:  HPI: Bethany Hoyt is a 79 year old female here to establish care and for L hip pain. She notes long history of issues with her left hip, including treatment for bursitis 10 years ago, and XR showing early DJD in 2011. For the past month, she notes worsening of her left low back and left SI/hip pain. Pain is 5/10. Worse when she lays in bed on that side. Pain is not worsened with walking quickly or stairs. Relieved with warm baths. Has been taking IBU and tylenol, though small amounts. Takes Calcium + vit D supplement for bone health. She has been very active recently with visits from her grandchildren.    She has been working with a chiropractor, Dr. Denver, who wrote a letter of her hx and requesting XR if they would be helpful.    PM/SHx:  Low back surgery in 1969. R hip replacement in 2015, which helped her low back pain.    HCM: Mammo 2017, Colonoscopy 2017, Dexa 2011- osteopenia.    Allergies:  No Known Allergies    ROS:  Constitutional, HEENT, cardiovascular, pulmonary, GI and  systems are negative, except as otherwise noted.    Past medical, surgical, and family history reviewed and updated as appropriate in the chart.  Relevant social history listed in HPI.    OBJECTIVE:  /76 (BP Location: Right arm, Patient Position: Sitting, Cuff Size: Adult Regular)   Pulse 80   Temp 98.7  F (37.1  C) (Tympanic)   Resp 16   Ht 1.651 m (5' 5\")   Wt 60.3 kg (133 lb)   LMP  (LMP Unknown)   SpO2 96%   Breastfeeding No   BMI 22.13 kg/m      EXAM:  Constitutional: Alert and oriented. No acute distress. Well-groomed, well-hydrated and well-nourished. "  Appears stated age.  Head: Normocephalic, atraumatic.  Eyes: EOMI  Skin: Warm, dry, intact.  No concerning rashes or lesions.  Musculoskeletal: L hip tenderness to palpation of the posterior acetabulum. No trochanteric bursa TTP. Moves arms and legs equally and normally.  Normal tone and strength throughout.  Neurologic: CN 2-12 grossly intact. Slight limp 2/2 L hip pain. No focal motor or sensory deficits. No tremor.  Psychiatric: Does not appear anxious or depressed.  Appropriate affect and insight.    XR:  FINDINGS: There is a right hip arthroplasty with components in  satisfactory position.   There is fairly severe degenerative change in the left hip with  circumferential joint space narrowing. There is some spur formation at  the head neck junction of the proximal femur.   No fracture or dislocation is seen.                                                                   IMPRESSION: Degenerative change in the left hip with intact right hip  Arthroplasty.  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 Hip pain, left + Low back pain  - XR Lumbar Spine 2/3 Views; Future  - XR Pelvis and Hip Bilateral 2 Views; Future    2. Arthritis of left hip  - Orthopedic & Spine  Referral; Future  - PHYSICAL THERAPY REFERRAL; Future    XR to be sent to Dr. Greg Denver, in [facility].    Yasmin Jesus MD  [facility]

## 2020-09-04 PROBLEM — M54.50 CHRONIC LEFT-SIDED LOW BACK PAIN WITHOUT SCIATICA: Status: ACTIVE | Noted: 2020-09-04

## 2020-09-04 PROBLEM — M16.12 ARTHRITIS OF LEFT HIP: Status: ACTIVE | Noted: 2020-09-04

## 2020-09-04 PROBLEM — G89.29 CHRONIC LEFT-SIDED LOW BACK PAIN WITHOUT SCIATICA: Status: ACTIVE | Noted: 2020-09-04

## 2020-09-10 ENCOUNTER — TELEPHONE (OUTPATIENT)
Dept: FAMILY MEDICINE | Facility: OTHER | Age: 80
End: 2020-09-10

## 2020-10-14 ENCOUNTER — OFFICE VISIT (OUTPATIENT)
Dept: ORTHOPEDICS | Facility: OTHER | Age: 80
End: 2020-10-14
Attending: FAMILY MEDICINE
Payer: COMMERCIAL

## 2020-10-14 VITALS
WEIGHT: 133 LBS | HEART RATE: 62 BPM | DIASTOLIC BLOOD PRESSURE: 70 MMHG | SYSTOLIC BLOOD PRESSURE: 120 MMHG | BODY MASS INDEX: 22.13 KG/M2

## 2020-10-14 DIAGNOSIS — M16.12 ARTHRITIS OF LEFT HIP: ICD-10-CM

## 2020-10-14 PROCEDURE — G0463 HOSPITAL OUTPT CLINIC VISIT: HCPCS

## 2020-10-14 PROCEDURE — 99202 OFFICE O/P NEW SF 15 MIN: CPT | Performed by: ORTHOPAEDIC SURGERY

## 2020-10-14 NOTE — PROGRESS NOTES
Visit Date:   10/14/2020      ORTHOPEDIC CLINIC - NEW PREOP NOTE       REASON FOR EVALUATION:  Left hip pain.      HISTORY:  Bethany comes in with regards to left hip.  She has had discomfort here for at least the last couple of years and it is getting progressively worse.  She has had prior very successful right total hip replacement.  She is interested in looking at potentially the same on her left side.  She has known arthritic changes present there as well.  She would like to have this done fairly soon as well in addition to that.  She is here to look at options at this time.  Pain deep in the groin, worse with activity, better with rest and seems to be progressing here over time at this time as well.      MEDICATIONS:  Reviewed, on none of significance.      ALLERGIES:  NO KNOWN DRUG ALLERGIES.      REVIEW OF SYSTEMS:  A 12-point otherwise negative with the exception as stated above.      PHYSICAL EXAMINATION:   VITAL SIGNS:  The patient is 5 feet 5 inches, 133 pounds, pulse is 62.   GENERAL:  She is alert and oriented x3, cooperative in exam, in no acute distress.  Does ambulate with some gait antalgia.  Affect is appropriate.     Examination of the left hip shows hip flexed to 90, internal rotation of about 5, external rotation 15, abduction 30.  Pain with hip flexion and internal rotation.  Mild swelling is seen there as well.  Neurovascular examination is otherwise intact in addition to that.  ZACH testing is otherwise negative here as well in addition to that.      IMAGING:  X-rays are reviewed, 2 views of the hip.  The patient does show severe left hip arthrosis, right hip shows a very good alignment and a well-healed prosthetic components at this time as well.      IMPRESSION:     1.  Left hip osteoarthrosis, severe.   2.  Right total hip replacement, doing well.      PLAN:  At this time, advised moving forward with joint reconstruction.  We will plan for left total hip direct anterior replacement.  We  will look at [facility] for intervention.  She would like to do this in potentially November if possible, a couple day stay, transition home, aspirin for DVT prophylaxis and tramadol for pain management there as well.  The patient is in agreement with that plan.  We will send the surgery request order form for her.         AZUCENA BOOGIE MD             D: 10/14/2020   T: 10/14/2020   MT: CHEPE      Name:     CORINNE KING   MRN:      [id number]        Account:      [id number]   :      1940           Visit Date:   10/14/2020      Document: [id number]

## 2020-10-14 NOTE — PROGRESS NOTES
Patient is here for consult on her left hip pain.   Jemima Howe LPN .....................10/14/2020 12:31 PM

## 2020-10-28 ENCOUNTER — ALLIED HEALTH/NURSE VISIT (OUTPATIENT)
Dept: FAMILY MEDICINE | Facility: OTHER | Age: 80
End: 2020-10-28
Attending: FAMILY MEDICINE
Payer: COMMERCIAL

## 2020-10-28 DIAGNOSIS — Z23 NEED FOR PROPHYLACTIC VACCINATION AND INOCULATION AGAINST INFLUENZA: Primary | ICD-10-CM

## 2020-10-28 PROCEDURE — G0008 ADMIN INFLUENZA VIRUS VAC: HCPCS

## 2020-11-11 ENCOUNTER — OFFICE VISIT (OUTPATIENT)
Dept: FAMILY MEDICINE | Facility: OTHER | Age: 80
End: 2020-11-11
Attending: FAMILY MEDICINE
Payer: COMMERCIAL

## 2020-11-11 VITALS
BODY MASS INDEX: 22.66 KG/M2 | WEIGHT: 136 LBS | HEIGHT: 65 IN | SYSTOLIC BLOOD PRESSURE: 126 MMHG | HEART RATE: 62 BPM | OXYGEN SATURATION: 98 % | RESPIRATION RATE: 16 BRPM | DIASTOLIC BLOOD PRESSURE: 74 MMHG

## 2020-11-11 DIAGNOSIS — Z01.818 PREOP GENERAL PHYSICAL EXAM: Primary | ICD-10-CM

## 2020-11-11 DIAGNOSIS — M16.12 PRIMARY OSTEOARTHRITIS OF LEFT HIP: ICD-10-CM

## 2020-11-11 LAB
ANION GAP SERPL CALCULATED.3IONS-SCNC: 8 MMOL/L (ref 3–14)
BUN SERPL-MCNC: 20 MG/DL (ref 7–25)
CALCIUM SERPL-MCNC: 9.4 MG/DL (ref 8.6–10.3)
CHLORIDE SERPL-SCNC: 102 MMOL/L (ref 98–107)
CO2 SERPL-SCNC: 29 MMOL/L (ref 21–31)
CREAT SERPL-MCNC: 0.75 MG/DL (ref 0.6–1.2)
ERYTHROCYTE [DISTWIDTH] IN BLOOD BY AUTOMATED COUNT: 13.3 % (ref 10–15)
GFR SERPL CREATININE-BSD FRML MDRD: 74 ML/MIN/{1.73_M2}
GLUCOSE SERPL-MCNC: 89 MG/DL (ref 70–105)
HCT VFR BLD AUTO: 40.4 % (ref 35–47)
HGB BLD-MCNC: 12.8 G/DL (ref 11.7–15.7)
MCH RBC QN AUTO: 31.9 PG (ref 26.5–33)
MCHC RBC AUTO-ENTMCNC: 31.7 G/DL (ref 31.5–36.5)
MCV RBC AUTO: 101 FL (ref 78–100)
PLATELET # BLD AUTO: 202 10E9/L (ref 150–450)
POTASSIUM SERPL-SCNC: 4.1 MMOL/L (ref 3.5–5.1)
RBC # BLD AUTO: 4.01 10E12/L (ref 3.8–5.2)
SODIUM SERPL-SCNC: 139 MMOL/L (ref 134–144)
WBC # BLD AUTO: 4.3 10E9/L (ref 4–11)

## 2020-11-11 PROCEDURE — 99214 OFFICE O/P EST MOD 30 MIN: CPT | Performed by: FAMILY MEDICINE

## 2020-11-11 PROCEDURE — 85027 COMPLETE CBC AUTOMATED: CPT | Mod: ZL | Performed by: FAMILY MEDICINE

## 2020-11-11 PROCEDURE — G0463 HOSPITAL OUTPT CLINIC VISIT: HCPCS

## 2020-11-11 PROCEDURE — 36415 COLL VENOUS BLD VENIPUNCTURE: CPT | Mod: ZL | Performed by: FAMILY MEDICINE

## 2020-11-11 PROCEDURE — 80048 BASIC METABOLIC PNL TOTAL CA: CPT | Mod: ZL | Performed by: FAMILY MEDICINE

## 2020-11-11 PROCEDURE — 93005 ELECTROCARDIOGRAM TRACING: CPT

## 2020-11-11 PROCEDURE — 93010 ELECTROCARDIOGRAM REPORT: CPT | Performed by: INTERNAL MEDICINE

## 2020-11-11 ASSESSMENT — MIFFLIN-ST. JEOR: SCORE: 1087.77

## 2020-11-11 NOTE — PATIENT INSTRUCTIONS

## 2020-11-11 NOTE — NURSING NOTE
Chief Complaint   Patient presents with     Pre-Op Exam         Medication Reconciliation: complete    Rosa Terry, LPN

## 2020-11-11 NOTE — H&P (VIEW-ONLY)
[facility]  [address]  [place] [address]  Phone: [phone]  Fax: [phone]  Primary Provider: Jimenez Jesus  Pre-op Performing Provider: JIMENEZ JESUS    PREOPERATIVE EVALUATION:  Today's date: 11/11/2020    Bethany Hoyt is a 80 year old female who presents for a preoperative evaluation.    Surgical Information:  Surgery/Procedure: Arthroplasty hip total direct anterior approach  Surgery Location: [facility]  Surgeon: Dr Lu  Surgery Date: 12/1/2020  Where patient plans to recover: At home with family  Fax number for surgical facility: Note does not need to be faxed, will be available electronically in Epic.    Type of Anesthesia Anticipated: General    Subjective     HPI related to upcoming procedure: Patient has had discomfort here for at least the last couple of years and it is getting progressively worse.  She has had prior very successful right total hip replacement.  Pain deep in the groin, worse with activity, better with rest and seems to be progressing here over time at this time as well.  She has had an x-ray showing early DJD in 2011.  For the past month her pain has been 5 out of 10, worse when she is laying in bed on that side. Has been taking IBU and tylenol, though small amounts. Takes Calcium + vit D supplement for bone health. She has been very active recently with visits from her grandchildren.    Preop Questions 11/11/2020   1. Have you ever had a heart attack or stroke? No   2. Have you ever had surgery on your heart or blood vessels, such as a stent placement, a coronary artery bypass, or surgery on an artery in your head, neck, heart, or legs? No   3. Do you have chest pain with activity? No   4. Do you have a history of  heart failure? No   5. Do you currently have a cold, bronchitis or symptoms of other infection? No   6. Do you have a cough, shortness of breath, or wheezing? No   7. Do you or anyone in your family have  previous history of blood clots? No   8. Do you or does anyone in your family have a serious bleeding problem such as prolonged bleeding following surgeries or cuts? No   9. Have you ever had problems with anemia or been told to take iron pills? No   10. Have you had any abnormal blood loss such as black, tarry or bloody stools, or abnormal vaginal bleeding? No   11. Have you ever had a blood transfusion? No   12. Are you willing to have a blood transfusion if it is medically needed before, during, or after your surgery? Yes   13. Have you or any of your relatives ever had problems with anesthesia? No   14. Do you have sleep apnea, excessive snoring or daytime drowsiness? No   15. Do you have any artifical heart valves or other implanted medical devices like a pacemaker, defibrillator, or continuous glucose monitor? No   16. Do you have artificial joints? YES - right hip in 2015   17. Are you allergic to latex? No     Health Care Directive:  Patient does not have a Health Care Directive or Living Will: Discussed advance care planning with patient; information given to patient to review.  She will fill out with  and bring on day of surgery.    Preoperative Review of :  Reviewed no concerns    Review of Systems  Right hip pain. Constitutional, HEENT, cardiovascular, pulmonary, GI and  systems are negative, except as otherwise noted.    Patient Active Problem List    Diagnosis Date Noted     Arthritis of left hip 09/04/2020     Priority: Medium     Chronic left-sided low back pain without sciatica 09/04/2020     Priority: Medium     Hyperlipidemia 01/26/2018     Priority: Medium     Overview:   Mild       Diverticulosis of sigmoid colon 06/19/2017     Priority: Medium     Prepatellar bursitis 03/25/2015     Priority: Medium     Subdeltoid bursitis 03/25/2015     Priority: Medium     H/O adenomatous polyp of colon 03/27/2012     Priority: Medium     Osteoarthrosis, unspecified whether generalized or  localized, pelvic region and thigh 10/07/2011     Priority: Medium     Personal history of malignant neoplasm of breast 01/24/2011     Priority: Medium     Disorder of bone and cartilage 01/24/2011     Priority: Medium      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     No Comments Provided     Bilateral carpal tunnel syndrome     No Comments Provided     Contact dermatitis     1/24/2011     Disease of pericardium     No Comments Provided     Disorder of cartilage     1/24/2011     Diverticulosis of intestine without perforation or abscess without bleeding     No Comments Provided     Diverticulosis of large intestine without perforation or abscess without bleeding     3/26/2012     History of colonic polyps     3/27/2012     Localized swelling, mass and lump, left upper limb     9/27/2013     Malignant neoplasm of female breast (H)     1990,estrogen receptor borderline, progesterone receptor positive, treated with Tamoxifen 10 mg. b.i.d. times five years.  Status post chemotherapy times one and Tamoxifen times five years.     Osteoarthritis of hip     10/7/2011     Personal history of malignant neoplasm of breast     1/24/2011     Past Surgical History:   Procedure Laterality Date     AS TOTAL HIP ARTHROPLASTY Right 2015     BACK SURGERY      1959     COLONOSCOPY      5/2002,Diverticulosis     COLONOSCOPY      3/26/2012,Follow up 2017     COLONOSCOPY      06/19/2017,F/U N/A     MASTECTOMY SIMPLE      1990,Right     OTHER SURGICAL HISTORY      9/30/13,925302,OTHER,left index finger, [place]     RELEASE CARPAL TUNNEL      2005,Right     RELEASE CARPAL TUNNEL      2010,Left     Current Outpatient Medications   Medication Sig Dispense Refill     ascorbic acid (VITAMIN C) 500 MG tablet Take 500 mg by mouth daily       Cholecalciferol (D 5000) 5000 UNITS TABS Take by mouth 2 times daily       Denture Care Products (EFFERDENT DENTURE CLEANSER) TBEF 1 tablet by Other route Twice  "a month       ibuprofen (ADVIL/MOTRIN) 200 MG capsule Take 200 mg by mouth At Bedtime       Multiple Vitamins-Minerals (MULTI COMPLETE PO) Take 1 tablet by mouth daily       PSYLLIUM PO Take by mouth daily       No Known Allergies     Social History     Tobacco Use     Smoking status: Never Smoker     Smokeless tobacco: Never Used   Substance Use Topics     Alcohol use: No     Family History   Problem Relation Age of Onset     Other - See Comments Father 85        Alzheimer's/Pneumonia     Cancer Mother         Cancer,Renal metastized to lung     Colon Cancer Brother         Cancer-colon     Anesthesia Reaction No family hx of         Anesthesia Problem     Blood Disease No family hx of         Blood Disease     Heart Disease No family hx of         Heart Disease     Diabetes No family hx of         Diabetes     Breast Cancer No family hx of         Cancer-breast     History   Drug Use No     Comment: Drug use: No         Objective     /74 (BP Location: Right arm, Patient Position: Sitting, Cuff Size: Adult Regular)   Pulse 62   Resp 16   Ht 1.651 m (5' 5\")   Wt 61.7 kg (136 lb)   LMP  (LMP Unknown)   SpO2 98%   Breastfeeding No   BMI 22.63 kg/m      Physical Exam    GENERAL APPEARANCE: healthy, alert and no distress     EYES: EOMI, PERRL     HENT: ear canals and TM's normal and nose and mouth without ulcers or lesions     NECK: no adenopathy, no asymmetry, masses, or scars and thyroid normal to palpation     RESP: lungs clear to auscultation - no rales, rhonchi or wheezes     CV: regular rates and rhythm, normal S1 S2, no S3 or S4 and no murmur, click or rub     ABDOMEN:  soft, nontender, no HSM or masses and bowel sounds normal  Musculoskeletal: L hip tenderness to palpation of the posterior acetabulum. No trochanteric bursa TTP. Moves arms and legs equally and normally.  Normal tone and strength throughout.  Neurologic: CN 2-12 grossly intact. Slight limp 2/2 L hip pain. No focal motor or sensory " deficits. No tremor.     PSYCH: mentation appears normal. and affect normal/bright     LYMPHATICS: No cervical adenopathy    Recent Labs   Lab Test 10/09/19  1419   HGB 13.6         POTASSIUM 4.0   CR 0.73      Diagnostics:  Office Visit on 11/11/2020   Component Date Value Ref Range Status     Interpretation ECG 11/11/2020 Click View Image link to view waveform and result   Preliminary     WBC 11/11/2020 4.3  4.0 - 11.0 10e9/L Final     RBC Count 11/11/2020 4.01  3.8 - 5.2 10e12/L Final     Hemoglobin 11/11/2020 12.8  11.7 - 15.7 g/dL Final     Hematocrit 11/11/2020 40.4  35.0 - 47.0 % Final     MCV 11/11/2020 101* 78 - 100 fl Final     MCH 11/11/2020 31.9  26.5 - 33.0 pg Final     MCHC 11/11/2020 31.7  31.5 - 36.5 g/dL Final     RDW 11/11/2020 13.3  10.0 - 15.0 % Final     Platelet Count 11/11/2020 202  150 - 450 10e9/L Final     Sodium 11/11/2020 139  134 - 144 mmol/L Final     Potassium 11/11/2020 4.1  3.5 - 5.1 mmol/L Final     Chloride 11/11/2020 102  98 - 107 mmol/L Final     Carbon Dioxide 11/11/2020 29  21 - 31 mmol/L Final     Anion Gap 11/11/2020 8  3 - 14 mmol/L Final     Glucose 11/11/2020 89  70 - 105 mg/dL Final     Urea Nitrogen 11/11/2020 20  7 - 25 mg/dL Final     Creatinine 11/11/2020 0.75  0.60 - 1.20 mg/dL Final     GFR Estimate 11/11/2020 74  >60 mL/min/[1.73_m2] Final     GFR Estimate If Black 11/11/2020 90  >60 mL/min/[1.73_m2] Final     Calcium 11/11/2020 9.4  8.6 - 10.3 mg/dL Final     EKG: appears normal, NSR, normal axis, normal intervals, no acute ST/T changes c/w ischemia    Revised Cardiac Risk Index (RCRI):  The patient has the following serious cardiovascular risks for perioperative complications:   - No serious cardiac risks = 0 points     RCRI Interpretation: 0 points: Class I (very low risk - 0.4% complication rate)     Assessment & Plan   The proposed surgical procedure is considered INTERMEDIATE risk.    1. Preop general physical exam  - EKG 12-lead complete w/read  - Clinics  - Basic Metabolic Panel; Future  - CBC W PLT No Diff    2. Primary osteoarthritis of left hip  -Proceed with total left hip replacement    Risks and Recommendations:  The patient has the following additional risks and recommendations for perioperative complications:   - No identified additional risk factors other than previously addressed    Medication Instructions:   - ibuprofen (Advil, Motrin): HOLD 1 day before surgery.     RECOMMENDATION:  APPROVAL GIVEN to proceed with proposed procedure, without further diagnostic evaluation.    Signed Electronically by: Yasmin Jesus MD    Copy of this evaluation report is provided to requesting physician.    Preop [facility] Preop Guidelines    Revised Cardiac Risk Index

## 2020-11-11 NOTE — PROGRESS NOTES
[facility]  [address]  [place] [address]  Phone: [phone]  Fax: [phone]  Primary Provider: Jimenez Jesus  Pre-op Performing Provider: JIMENEZ JESUS    PREOPERATIVE EVALUATION:  Today's date: 11/11/2020    Bethany Hoyt is a 80 year old female who presents for a preoperative evaluation.    Surgical Information:  Surgery/Procedure: Arthroplasty hip total direct anterior approach  Surgery Location: [facility]  Surgeon: Dr Lu  Surgery Date: 12/1/2020  Where patient plans to recover: At home with family  Fax number for surgical facility: Note does not need to be faxed, will be available electronically in Epic.    Type of Anesthesia Anticipated: General    Subjective     HPI related to upcoming procedure: Patient has had discomfort here for at least the last couple of years and it is getting progressively worse.  She has had prior very successful right total hip replacement.  Pain deep in the groin, worse with activity, better with rest and seems to be progressing here over time at this time as well.  She has had an x-ray showing early DJD in 2011.  For the past month her pain has been 5 out of 10, worse when she is laying in bed on that side. Has been taking IBU and tylenol, though small amounts. Takes Calcium + vit D supplement for bone health. She has been very active recently with visits from her grandchildren.    Preop Questions 11/11/2020   1. Have you ever had a heart attack or stroke? No   2. Have you ever had surgery on your heart or blood vessels, such as a stent placement, a coronary artery bypass, or surgery on an artery in your head, neck, heart, or legs? No   3. Do you have chest pain with activity? No   4. Do you have a history of  heart failure? No   5. Do you currently have a cold, bronchitis or symptoms of other infection? No   6. Do you have a cough, shortness of breath, or wheezing? No   7. Do you or anyone in your family have  previous history of blood clots? No   8. Do you or does anyone in your family have a serious bleeding problem such as prolonged bleeding following surgeries or cuts? No   9. Have you ever had problems with anemia or been told to take iron pills? No   10. Have you had any abnormal blood loss such as black, tarry or bloody stools, or abnormal vaginal bleeding? No   11. Have you ever had a blood transfusion? No   12. Are you willing to have a blood transfusion if it is medically needed before, during, or after your surgery? Yes   13. Have you or any of your relatives ever had problems with anesthesia? No   14. Do you have sleep apnea, excessive snoring or daytime drowsiness? No   15. Do you have any artifical heart valves or other implanted medical devices like a pacemaker, defibrillator, or continuous glucose monitor? No   16. Do you have artificial joints? YES - right hip in 2015   17. Are you allergic to latex? No     Health Care Directive:  Patient does not have a Health Care Directive or Living Will: Discussed advance care planning with patient; information given to patient to review.  She will fill out with  and bring on day of surgery.    Preoperative Review of :  Reviewed no concerns    Review of Systems  Right hip pain. Constitutional, HEENT, cardiovascular, pulmonary, GI and  systems are negative, except as otherwise noted.    Patient Active Problem List    Diagnosis Date Noted     Arthritis of left hip 09/04/2020     Priority: Medium     Chronic left-sided low back pain without sciatica 09/04/2020     Priority: Medium     Hyperlipidemia 01/26/2018     Priority: Medium     Overview:   Mild       Diverticulosis of sigmoid colon 06/19/2017     Priority: Medium     Prepatellar bursitis 03/25/2015     Priority: Medium     Subdeltoid bursitis 03/25/2015     Priority: Medium     H/O adenomatous polyp of colon 03/27/2012     Priority: Medium     Osteoarthrosis, unspecified whether generalized or  localized, pelvic region and thigh 10/07/2011     Priority: Medium     Personal history of malignant neoplasm of breast 01/24/2011     Priority: Medium     Disorder of bone and cartilage 01/24/2011     Priority: Medium      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     No Comments Provided     Bilateral carpal tunnel syndrome     No Comments Provided     Contact dermatitis     1/24/2011     Disease of pericardium     No Comments Provided     Disorder of cartilage     1/24/2011     Diverticulosis of intestine without perforation or abscess without bleeding     No Comments Provided     Diverticulosis of large intestine without perforation or abscess without bleeding     3/26/2012     History of colonic polyps     3/27/2012     Localized swelling, mass and lump, left upper limb     9/27/2013     Malignant neoplasm of female breast (H)     1990,estrogen receptor borderline, progesterone receptor positive, treated with Tamoxifen 10 mg. b.i.d. times five years.  Status post chemotherapy times one and Tamoxifen times five years.     Osteoarthritis of hip     10/7/2011     Personal history of malignant neoplasm of breast     1/24/2011     Past Surgical History:   Procedure Laterality Date     AS TOTAL HIP ARTHROPLASTY Right 2015     BACK SURGERY      1959     COLONOSCOPY      5/2002,Diverticulosis     COLONOSCOPY      3/26/2012,Follow up 2017     COLONOSCOPY      06/19/2017,F/U N/A     MASTECTOMY SIMPLE      1990,Right     OTHER SURGICAL HISTORY      9/30/13,023904,OTHER,left index finger, [facility]     RELEASE CARPAL TUNNEL      2005,Right     RELEASE CARPAL TUNNEL      2010,Left     Current Outpatient Medications   Medication Sig Dispense Refill     ascorbic acid (VITAMIN C) 500 MG tablet Take 500 mg by mouth daily       Cholecalciferol (D 5000) 5000 UNITS TABS Take by mouth 2 times daily       Denture Care Products (EFFERDENT DENTURE CLEANSER) TBEF 1 tablet by Other route Twice  "a month       ibuprofen (ADVIL/MOTRIN) 200 MG capsule Take 200 mg by mouth At Bedtime       Multiple Vitamins-Minerals (MULTI COMPLETE PO) Take 1 tablet by mouth daily       PSYLLIUM PO Take by mouth daily       No Known Allergies     Social History     Tobacco Use     Smoking status: Never Smoker     Smokeless tobacco: Never Used   Substance Use Topics     Alcohol use: No     Family History   Problem Relation Age of Onset     Other - See Comments Father 85        Alzheimer's/Pneumonia     Cancer Mother         Cancer,Renal metastized to lung     Colon Cancer Brother         Cancer-colon     Anesthesia Reaction No family hx of         Anesthesia Problem     Blood Disease No family hx of         Blood Disease     Heart Disease No family hx of         Heart Disease     Diabetes No family hx of         Diabetes     Breast Cancer No family hx of         Cancer-breast     History   Drug Use No     Comment: Drug use: No         Objective     /74 (BP Location: Right arm, Patient Position: Sitting, Cuff Size: Adult Regular)   Pulse 62   Resp 16   Ht 1.651 m (5' 5\")   Wt 61.7 kg (136 lb)   LMP  (LMP Unknown)   SpO2 98%   Breastfeeding No   BMI 22.63 kg/m      Physical Exam    GENERAL APPEARANCE: healthy, alert and no distress     EYES: EOMI, PERRL     HENT: ear canals and TM's normal and nose and mouth without ulcers or lesions     NECK: no adenopathy, no asymmetry, masses, or scars and thyroid normal to palpation     RESP: lungs clear to auscultation - no rales, rhonchi or wheezes     CV: regular rates and rhythm, normal S1 S2, no S3 or S4 and no murmur, click or rub     ABDOMEN:  soft, nontender, no HSM or masses and bowel sounds normal  Musculoskeletal: L hip tenderness to palpation of the posterior acetabulum. No trochanteric bursa TTP. Moves arms and legs equally and normally.  Normal tone and strength throughout.  Neurologic: CN 2-12 grossly intact. Slight limp 2/2 L hip pain. No focal motor or sensory " deficits. No tremor.     PSYCH: mentation appears normal. and affect normal/bright     LYMPHATICS: No cervical adenopathy    Recent Labs   Lab Test 10/09/19  1419   HGB 13.6         POTASSIUM 4.0   CR 0.73      Diagnostics:  Office Visit on 11/11/2020   Component Date Value Ref Range Status     Interpretation ECG 11/11/2020 Click View Image link to view waveform and result   Preliminary     WBC 11/11/2020 4.3  4.0 - 11.0 10e9/L Final     RBC Count 11/11/2020 4.01  3.8 - 5.2 10e12/L Final     Hemoglobin 11/11/2020 12.8  11.7 - 15.7 g/dL Final     Hematocrit 11/11/2020 40.4  35.0 - 47.0 % Final     MCV 11/11/2020 101* 78 - 100 fl Final     MCH 11/11/2020 31.9  26.5 - 33.0 pg Final     MCHC 11/11/2020 31.7  31.5 - 36.5 g/dL Final     RDW 11/11/2020 13.3  10.0 - 15.0 % Final     Platelet Count 11/11/2020 202  150 - 450 10e9/L Final     Sodium 11/11/2020 139  134 - 144 mmol/L Final     Potassium 11/11/2020 4.1  3.5 - 5.1 mmol/L Final     Chloride 11/11/2020 102  98 - 107 mmol/L Final     Carbon Dioxide 11/11/2020 29  21 - 31 mmol/L Final     Anion Gap 11/11/2020 8  3 - 14 mmol/L Final     Glucose 11/11/2020 89  70 - 105 mg/dL Final     Urea Nitrogen 11/11/2020 20  7 - 25 mg/dL Final     Creatinine 11/11/2020 0.75  0.60 - 1.20 mg/dL Final     GFR Estimate 11/11/2020 74  >60 mL/min/[1.73_m2] Final     GFR Estimate If Black 11/11/2020 90  >60 mL/min/[1.73_m2] Final     Calcium 11/11/2020 9.4  8.6 - 10.3 mg/dL Final     EKG: appears normal, NSR, normal axis, normal intervals, no acute ST/T changes c/w ischemia    Revised Cardiac Risk Index (RCRI):  The patient has the following serious cardiovascular risks for perioperative complications:   - No serious cardiac risks = 0 points     RCRI Interpretation: 0 points: Class I (very low risk - 0.4% complication rate)     Assessment & Plan   The proposed surgical procedure is considered INTERMEDIATE risk.    1. Preop general physical exam  - EKG 12-lead complete w/read  - Clinics  - Basic Metabolic Panel; Future  - CBC W PLT No Diff    2. Primary osteoarthritis of left hip  -Proceed with total left hip replacement    Risks and Recommendations:  The patient has the following additional risks and recommendations for perioperative complications:   - No identified additional risk factors other than previously addressed    Medication Instructions:   - ibuprofen (Advil, Motrin): HOLD 1 day before surgery.     RECOMMENDATION:  APPROVAL GIVEN to proceed with proposed procedure, without further diagnostic evaluation.    Signed Electronically by: Yasmin Jesus MD    Copy of this evaluation report is provided to requesting physician.    Preop [facility] Preop Guidelines    Revised Cardiac Risk Index

## 2020-11-16 PROBLEM — M16.12 ARTHRITIS OF LEFT HIP: Status: ACTIVE | Noted: 2020-10-15

## 2020-11-18 LAB — INTERPRETATION ECG - MUSE: NORMAL

## 2020-11-27 ENCOUNTER — ALLIED HEALTH/NURSE VISIT (OUTPATIENT)
Dept: FAMILY MEDICINE | Facility: OTHER | Age: 80
End: 2020-11-27
Attending: FAMILY MEDICINE
Payer: COMMERCIAL

## 2020-11-27 DIAGNOSIS — Z20.822 ENCOUNTER FOR LABORATORY TESTING FOR COVID-19 VIRUS: Primary | ICD-10-CM

## 2020-11-27 PROCEDURE — 99207 PR NO CHARGE LOS: CPT

## 2020-11-27 PROCEDURE — C9803 HOPD COVID-19 SPEC COLLECT: HCPCS

## 2020-11-27 PROCEDURE — U0003 INFECTIOUS AGENT DETECTION BY NUCLEIC ACID (DNA OR RNA); SEVERE ACUTE RESPIRATORY SYNDROME CORONAVIRUS 2 (SARS-COV-2) (CORONAVIRUS DISEASE [COVID-19]), AMPLIFIED PROBE TECHNIQUE, MAKING USE OF HIGH THROUGHPUT TECHNOLOGIES AS DESCRIBED BY CMS-2020-01-R: HCPCS | Mod: ZL | Performed by: ORTHOPAEDIC SURGERY

## 2020-11-28 LAB
LABORATORY COMMENT REPORT: NORMAL
SARS-COV-2 RNA SPEC QL NAA+PROBE: NEGATIVE
SARS-COV-2 RNA SPEC QL NAA+PROBE: NORMAL
SPECIMEN SOURCE: NORMAL
SPECIMEN SOURCE: NORMAL

## 2020-11-30 ENCOUNTER — ANESTHESIA EVENT (OUTPATIENT)
Dept: SURGERY | Facility: OTHER | Age: 80
DRG: 470 | End: 2020-11-30
Payer: COMMERCIAL

## 2020-12-01 ENCOUNTER — APPOINTMENT (OUTPATIENT)
Dept: GENERAL RADIOLOGY | Facility: OTHER | Age: 80
DRG: 470 | End: 2020-12-01
Attending: ORTHOPAEDIC SURGERY
Payer: COMMERCIAL

## 2020-12-01 ENCOUNTER — ANESTHESIA (OUTPATIENT)
Dept: SURGERY | Facility: OTHER | Age: 80
DRG: 470 | End: 2020-12-01
Payer: COMMERCIAL

## 2020-12-01 ENCOUNTER — APPOINTMENT (OUTPATIENT)
Dept: PHYSICAL THERAPY | Facility: OTHER | Age: 80
DRG: 470 | End: 2020-12-01
Attending: ORTHOPAEDIC SURGERY
Payer: COMMERCIAL

## 2020-12-01 ENCOUNTER — HOSPITAL ENCOUNTER (OUTPATIENT)
Dept: GENERAL RADIOLOGY | Facility: OTHER | Age: 80
DRG: 470 | End: 2020-12-01
Attending: ORTHOPAEDIC SURGERY | Admitting: ORTHOPAEDIC SURGERY
Payer: COMMERCIAL

## 2020-12-01 ENCOUNTER — HOSPITAL ENCOUNTER (INPATIENT)
Facility: OTHER | Age: 80
LOS: 2 days | Discharge: HOME | DRG: 470 | End: 2020-12-03
Attending: ORTHOPAEDIC SURGERY | Admitting: ORTHOPAEDIC SURGERY
Payer: COMMERCIAL

## 2020-12-01 DIAGNOSIS — Z96.642 STATUS POST TOTAL REPLACEMENT OF LEFT HIP: Primary | ICD-10-CM

## 2020-12-01 DIAGNOSIS — M16.12 ARTHRITIS OF LEFT HIP: ICD-10-CM

## 2020-12-01 DIAGNOSIS — Z96.649 S/P TOTAL HIP ARTHROPLASTY: ICD-10-CM

## 2020-12-01 PROCEDURE — 999N000180 XR SURGERY CARM FLUORO LESS THAN 5 MIN

## 2020-12-01 PROCEDURE — 250N000009 HC RX 250: Performed by: NURSE ANESTHETIST, CERTIFIED REGISTERED

## 2020-12-01 PROCEDURE — 76942 ECHO GUIDE FOR BIOPSY: CPT | Mod: 26 | Performed by: NURSE ANESTHETIST, CERTIFIED REGISTERED

## 2020-12-01 PROCEDURE — 258N000003 HC RX IP 258 OP 636: Performed by: NURSE ANESTHETIST, CERTIFIED REGISTERED

## 2020-12-01 PROCEDURE — 97530 THERAPEUTIC ACTIVITIES: CPT | Mod: GP

## 2020-12-01 PROCEDURE — 250N000011 HC RX IP 250 OP 636: Performed by: NURSE ANESTHETIST, CERTIFIED REGISTERED

## 2020-12-01 PROCEDURE — 999N000136 HC STATISTIC PRE PROC ASSESS II: Performed by: ORTHOPAEDIC SURGERY

## 2020-12-01 PROCEDURE — 99222 1ST HOSP IP/OBS MODERATE 55: CPT | Performed by: INTERNAL MEDICINE

## 2020-12-01 PROCEDURE — 250N000009 HC RX 250: Performed by: ORTHOPAEDIC SURGERY

## 2020-12-01 PROCEDURE — 370N000001 HC ANESTHESIA TECHNICAL FEE, 1ST 30 MIN: Performed by: ORTHOPAEDIC SURGERY

## 2020-12-01 PROCEDURE — 97161 PT EVAL LOW COMPLEX 20 MIN: CPT | Mod: GP

## 2020-12-01 PROCEDURE — 360N000026 HC SURGERY LEVEL 4 1ST 30 MIN: Performed by: ORTHOPAEDIC SURGERY

## 2020-12-01 PROCEDURE — 258N000001 HC RX 258: Performed by: ORTHOPAEDIC SURGERY

## 2020-12-01 PROCEDURE — 999N000063 XR PELVIS PORT 1/2 VW

## 2020-12-01 PROCEDURE — C1713 ANCHOR/SCREW BN/BN,TIS/BN: HCPCS | Performed by: ORTHOPAEDIC SURGERY

## 2020-12-01 PROCEDURE — 761N000003 HC RECOVERY PHASE 1 LEVEL 2 FIRST HR: Performed by: ORTHOPAEDIC SURGERY

## 2020-12-01 PROCEDURE — 27130 TOTAL HIP ARTHROPLASTY: CPT | Mod: LT | Performed by: ORTHOPAEDIC SURGERY

## 2020-12-01 PROCEDURE — 278N000064 HC OR IMPLANT OTHER OPNP: Performed by: ORTHOPAEDIC SURGERY

## 2020-12-01 PROCEDURE — 370N000002 HC ANESTHESIA TECHNICAL FEE, EACH ADDTL 15 MIN: Performed by: ORTHOPAEDIC SURGERY

## 2020-12-01 PROCEDURE — 360N000027 HC SURGERY LEVEL 4 EA 15 ADDTL MIN: Performed by: ORTHOPAEDIC SURGERY

## 2020-12-01 PROCEDURE — 250N000013 HC RX MED GY IP 250 OP 250 PS 637: Performed by: INTERNAL MEDICINE

## 2020-12-01 PROCEDURE — C1776 JOINT DEVICE (IMPLANTABLE): HCPCS | Performed by: ORTHOPAEDIC SURGERY

## 2020-12-01 PROCEDURE — 64447 NJX AA&/STRD FEMORAL NRV IMG: CPT | Mod: LT | Performed by: NURSE ANESTHETIST, CERTIFIED REGISTERED

## 2020-12-01 PROCEDURE — 27130 TOTAL HIP ARTHROPLASTY: CPT | Mod: AS | Performed by: PHYSICIAN ASSISTANT

## 2020-12-01 PROCEDURE — 250N000011 HC RX IP 250 OP 636: Performed by: ORTHOPAEDIC SURGERY

## 2020-12-01 PROCEDURE — 250N000013 HC RX MED GY IP 250 OP 250 PS 637: Performed by: ORTHOPAEDIC SURGERY

## 2020-12-01 PROCEDURE — 120N000001 HC R&B MED SURG/OB

## 2020-12-01 PROCEDURE — 272N000002 HC OR SUPPLY OTHER OPNP: Performed by: ORTHOPAEDIC SURGERY

## 2020-12-01 PROCEDURE — 272N000001 HC OR GENERAL SUPPLY STERILE: Performed by: ORTHOPAEDIC SURGERY

## 2020-12-01 PROCEDURE — 0SRB04Z REPLACEMENT OF LEFT HIP JOINT WITH CERAMIC ON POLYETHYLENE SYNTHETIC SUBSTITUTE, OPEN APPROACH: ICD-10-PCS | Performed by: ORTHOPAEDIC SURGERY

## 2020-12-01 DEVICE — IMPLANTABLE DEVICE: Type: IMPLANTABLE DEVICE | Site: HIP | Status: FUNCTIONAL

## 2020-12-01 DEVICE — IMPLANTABLE DEVICE
Type: IMPLANTABLE DEVICE | Site: HIP | Status: FUNCTIONAL
Brand: TAPERLOC COMPLETE PRIMARY FEMORAL

## 2020-12-01 DEVICE — IMPLANTABLE DEVICE
Type: IMPLANTABLE DEVICE | Site: HIP | Status: FUNCTIONAL
Brand: BIOLOX® OPTION

## 2020-12-01 RX ORDER — MULTIPLE VITAMINS W/ MINERALS TAB 9MG-400MCG
1 TAB ORAL DAILY
Status: DISCONTINUED | OUTPATIENT
Start: 2020-12-01 | End: 2020-12-03 | Stop reason: HOSPADM

## 2020-12-01 RX ORDER — TRANEXAMIC ACID 650 MG/1
1950 TABLET ORAL ONCE
Status: COMPLETED | OUTPATIENT
Start: 2020-12-01 | End: 2020-12-01

## 2020-12-01 RX ORDER — PROCHLORPERAZINE MALEATE 5 MG
5 TABLET ORAL EVERY 6 HOURS PRN
Status: DISCONTINUED | OUTPATIENT
Start: 2020-12-01 | End: 2020-12-03 | Stop reason: HOSPADM

## 2020-12-01 RX ORDER — CEFAZOLIN SODIUM 1 G/50ML
1 INJECTION, SOLUTION INTRAVENOUS SEE ADMIN INSTRUCTIONS
Status: DISCONTINUED | OUTPATIENT
Start: 2020-12-01 | End: 2020-12-01 | Stop reason: HOSPADM

## 2020-12-01 RX ORDER — FLUMAZENIL 0.1 MG/ML
0.2 INJECTION, SOLUTION INTRAVENOUS
Status: DISCONTINUED | OUTPATIENT
Start: 2020-12-01 | End: 2020-12-01 | Stop reason: HOSPADM

## 2020-12-01 RX ORDER — GABAPENTIN 100 MG/1
100 CAPSULE ORAL AT BEDTIME
Status: DISCONTINUED | OUTPATIENT
Start: 2020-12-01 | End: 2020-12-03 | Stop reason: HOSPADM

## 2020-12-01 RX ORDER — NALOXONE HYDROCHLORIDE 0.4 MG/ML
0.2 INJECTION, SOLUTION INTRAMUSCULAR; INTRAVENOUS; SUBCUTANEOUS
Status: ACTIVE | OUTPATIENT
Start: 2020-12-01 | End: 2020-12-02

## 2020-12-01 RX ORDER — HYDROMORPHONE HYDROCHLORIDE 1 MG/ML
0.3 INJECTION, SOLUTION INTRAMUSCULAR; INTRAVENOUS; SUBCUTANEOUS
Status: DISCONTINUED | OUTPATIENT
Start: 2020-12-01 | End: 2020-12-03 | Stop reason: HOSPADM

## 2020-12-01 RX ORDER — FENTANYL CITRATE 50 UG/ML
25-50 INJECTION, SOLUTION INTRAMUSCULAR; INTRAVENOUS
Status: DISCONTINUED | OUTPATIENT
Start: 2020-12-01 | End: 2020-12-01 | Stop reason: HOSPADM

## 2020-12-01 RX ORDER — HYDROCODONE BITARTRATE AND ACETAMINOPHEN 5; 325 MG/1; MG/1
1-2 TABLET ORAL EVERY 4 HOURS PRN
Status: DISCONTINUED | OUTPATIENT
Start: 2020-12-01 | End: 2020-12-03 | Stop reason: HOSPADM

## 2020-12-01 RX ORDER — VITAMIN E 268 MG
400 CAPSULE ORAL DAILY
COMMUNITY

## 2020-12-01 RX ORDER — ONDANSETRON 2 MG/ML
4 INJECTION INTRAMUSCULAR; INTRAVENOUS EVERY 30 MIN PRN
Status: DISCONTINUED | OUTPATIENT
Start: 2020-12-01 | End: 2020-12-01 | Stop reason: HOSPADM

## 2020-12-01 RX ORDER — LIDOCAINE HYDROCHLORIDE 10 MG/ML
INJECTION, SOLUTION INFILTRATION; PERINEURAL PRN
Status: DISCONTINUED | OUTPATIENT
Start: 2020-12-01 | End: 2020-12-01

## 2020-12-01 RX ORDER — CEFAZOLIN SODIUM 2 G/100ML
2 INJECTION, SOLUTION INTRAVENOUS
Status: COMPLETED | OUTPATIENT
Start: 2020-12-01 | End: 2020-12-01

## 2020-12-01 RX ORDER — BUPIVACAINE HYDROCHLORIDE 5 MG/ML
INJECTION, SOLUTION PERINEURAL PRN
Status: DISCONTINUED | OUTPATIENT
Start: 2020-12-01 | End: 2020-12-01 | Stop reason: HOSPADM

## 2020-12-01 RX ORDER — PROPOFOL 10 MG/ML
INJECTION, EMULSION INTRAVENOUS CONTINUOUS PRN
Status: DISCONTINUED | OUTPATIENT
Start: 2020-12-01 | End: 2020-12-01

## 2020-12-01 RX ORDER — NALOXONE HYDROCHLORIDE 0.4 MG/ML
0.4 INJECTION, SOLUTION INTRAMUSCULAR; INTRAVENOUS; SUBCUTANEOUS
Status: DISCONTINUED | OUTPATIENT
Start: 2020-12-01 | End: 2020-12-01

## 2020-12-01 RX ORDER — NALOXONE HYDROCHLORIDE 0.4 MG/ML
0.2 INJECTION, SOLUTION INTRAMUSCULAR; INTRAVENOUS; SUBCUTANEOUS
Status: DISCONTINUED | OUTPATIENT
Start: 2020-12-01 | End: 2020-12-01

## 2020-12-01 RX ORDER — OMEGA-3 FATTY ACIDS/FISH OIL 300-1000MG
200 CAPSULE ORAL AT BEDTIME
Status: DISCONTINUED | OUTPATIENT
Start: 2020-12-01 | End: 2020-12-01 | Stop reason: DRUGHIGH

## 2020-12-01 RX ORDER — LIDOCAINE 40 MG/G
CREAM TOPICAL
Status: DISCONTINUED | OUTPATIENT
Start: 2020-12-01 | End: 2020-12-01 | Stop reason: HOSPADM

## 2020-12-01 RX ORDER — ASCORBIC ACID 500 MG
500 TABLET ORAL DAILY
Status: DISCONTINUED | OUTPATIENT
Start: 2020-12-01 | End: 2020-12-01

## 2020-12-01 RX ORDER — SODIUM CHLORIDE, SODIUM LACTATE, POTASSIUM CHLORIDE, CALCIUM CHLORIDE 600; 310; 30; 20 MG/100ML; MG/100ML; MG/100ML; MG/100ML
INJECTION, SOLUTION INTRAVENOUS CONTINUOUS
Status: DISCONTINUED | OUTPATIENT
Start: 2020-12-01 | End: 2020-12-01 | Stop reason: HOSPADM

## 2020-12-01 RX ORDER — DEXAMETHASONE SODIUM PHOSPHATE 4 MG/ML
INJECTION, SOLUTION INTRA-ARTICULAR; INTRALESIONAL; INTRAMUSCULAR; INTRAVENOUS; SOFT TISSUE PRN
Status: DISCONTINUED | OUTPATIENT
Start: 2020-12-01 | End: 2020-12-01

## 2020-12-01 RX ORDER — ONDANSETRON 4 MG/1
4 TABLET, ORALLY DISINTEGRATING ORAL EVERY 30 MIN PRN
Status: DISCONTINUED | OUTPATIENT
Start: 2020-12-01 | End: 2020-12-01 | Stop reason: HOSPADM

## 2020-12-01 RX ORDER — AMOXICILLIN 250 MG
2 CAPSULE ORAL 2 TIMES DAILY
Status: DISCONTINUED | OUTPATIENT
Start: 2020-12-01 | End: 2020-12-03 | Stop reason: HOSPADM

## 2020-12-01 RX ORDER — ONDANSETRON 4 MG/1
4 TABLET, ORALLY DISINTEGRATING ORAL EVERY 6 HOURS PRN
Status: DISCONTINUED | OUTPATIENT
Start: 2020-12-01 | End: 2020-12-03 | Stop reason: HOSPADM

## 2020-12-01 RX ORDER — BUPIVACAINE HYDROCHLORIDE 7.5 MG/ML
INJECTION, SOLUTION INTRASPINAL PRN
Status: DISCONTINUED | OUTPATIENT
Start: 2020-12-01 | End: 2020-12-01

## 2020-12-01 RX ORDER — LIDOCAINE HYDROCHLORIDE 20 MG/ML
INJECTION, SOLUTION INFILTRATION; PERINEURAL PRN
Status: DISCONTINUED | OUTPATIENT
Start: 2020-12-01 | End: 2020-12-01

## 2020-12-01 RX ORDER — ACETAMINOPHEN 325 MG/1
975 TABLET ORAL ONCE
Status: COMPLETED | OUTPATIENT
Start: 2020-12-01 | End: 2020-12-01

## 2020-12-01 RX ORDER — NALOXONE HYDROCHLORIDE 0.4 MG/ML
.1-.4 INJECTION, SOLUTION INTRAMUSCULAR; INTRAVENOUS; SUBCUTANEOUS
Status: DISCONTINUED | OUTPATIENT
Start: 2020-12-01 | End: 2020-12-01 | Stop reason: HOSPADM

## 2020-12-01 RX ORDER — SODIUM CHLORIDE, SODIUM LACTATE, POTASSIUM CHLORIDE, CALCIUM CHLORIDE 600; 310; 30; 20 MG/100ML; MG/100ML; MG/100ML; MG/100ML
INJECTION, SOLUTION INTRAVENOUS CONTINUOUS
Status: DISCONTINUED | OUTPATIENT
Start: 2020-12-01 | End: 2020-12-02

## 2020-12-01 RX ORDER — DIPHENHYDRAMINE HCL 12.5 MG/5ML
12.5 SOLUTION ORAL EVERY 6 HOURS PRN
Status: DISCONTINUED | OUTPATIENT
Start: 2020-12-01 | End: 2020-12-03 | Stop reason: HOSPADM

## 2020-12-01 RX ORDER — HYDROXYZINE HYDROCHLORIDE 10 MG/1
10 TABLET, FILM COATED ORAL EVERY 6 HOURS PRN
Status: DISCONTINUED | OUTPATIENT
Start: 2020-12-01 | End: 2020-12-03 | Stop reason: HOSPADM

## 2020-12-01 RX ORDER — CEFAZOLIN SODIUM 1 G/50ML
1 INJECTION, SOLUTION INTRAVENOUS EVERY 8 HOURS
Status: COMPLETED | OUTPATIENT
Start: 2020-12-01 | End: 2020-12-02

## 2020-12-01 RX ORDER — GLYCOPYRROLATE 0.2 MG/ML
INJECTION, SOLUTION INTRAMUSCULAR; INTRAVENOUS PRN
Status: DISCONTINUED | OUTPATIENT
Start: 2020-12-01 | End: 2020-12-01

## 2020-12-01 RX ORDER — HYDROMORPHONE HYDROCHLORIDE 1 MG/ML
.3-.5 INJECTION, SOLUTION INTRAMUSCULAR; INTRAVENOUS; SUBCUTANEOUS EVERY 5 MIN PRN
Status: DISCONTINUED | OUTPATIENT
Start: 2020-12-01 | End: 2020-12-01 | Stop reason: HOSPADM

## 2020-12-01 RX ORDER — BISACODYL 10 MG
10 SUPPOSITORY, RECTAL RECTAL DAILY PRN
Status: DISCONTINUED | OUTPATIENT
Start: 2020-12-01 | End: 2020-12-03 | Stop reason: HOSPADM

## 2020-12-01 RX ORDER — LIDOCAINE 40 MG/G
CREAM TOPICAL
Status: DISCONTINUED | OUTPATIENT
Start: 2020-12-01 | End: 2020-12-03 | Stop reason: HOSPADM

## 2020-12-01 RX ORDER — KETOROLAC TROMETHAMINE 15 MG/ML
15 INJECTION, SOLUTION INTRAMUSCULAR; INTRAVENOUS EVERY 6 HOURS PRN
Status: DISCONTINUED | OUTPATIENT
Start: 2020-12-01 | End: 2020-12-03 | Stop reason: HOSPADM

## 2020-12-01 RX ORDER — ACETAMINOPHEN 500 MG
500-1000 TABLET ORAL EVERY 6 HOURS PRN
Status: DISCONTINUED | OUTPATIENT
Start: 2020-12-01 | End: 2020-12-03 | Stop reason: HOSPADM

## 2020-12-01 RX ORDER — DEXAMETHASONE SODIUM PHOSPHATE 10 MG/ML
INJECTION, SOLUTION INTRAMUSCULAR; INTRAVENOUS PRN
Status: DISCONTINUED | OUTPATIENT
Start: 2020-12-01 | End: 2020-12-01

## 2020-12-01 RX ORDER — ONDANSETRON 2 MG/ML
INJECTION INTRAMUSCULAR; INTRAVENOUS PRN
Status: DISCONTINUED | OUTPATIENT
Start: 2020-12-01 | End: 2020-12-01

## 2020-12-01 RX ORDER — NALOXONE HYDROCHLORIDE 0.4 MG/ML
0.4 INJECTION, SOLUTION INTRAMUSCULAR; INTRAVENOUS; SUBCUTANEOUS
Status: ACTIVE | OUTPATIENT
Start: 2020-12-01 | End: 2020-12-02

## 2020-12-01 RX ORDER — ACETAMINOPHEN 500 MG
500-1000 TABLET ORAL EVERY 6 HOURS PRN
COMMUNITY

## 2020-12-01 RX ORDER — IBUPROFEN 400 MG/1
400 TABLET, FILM COATED ORAL AT BEDTIME
Status: DISCONTINUED | OUTPATIENT
Start: 2020-12-01 | End: 2020-12-03 | Stop reason: HOSPADM

## 2020-12-01 RX ORDER — ONDANSETRON 2 MG/ML
4 INJECTION INTRAMUSCULAR; INTRAVENOUS EVERY 6 HOURS PRN
Status: DISCONTINUED | OUTPATIENT
Start: 2020-12-01 | End: 2020-12-03 | Stop reason: HOSPADM

## 2020-12-01 RX ORDER — POLYETHYLENE GLYCOL 3350 17 G/17G
17 POWDER, FOR SOLUTION ORAL DAILY
Status: DISCONTINUED | OUTPATIENT
Start: 2020-12-01 | End: 2020-12-03 | Stop reason: HOSPADM

## 2020-12-01 RX ADMIN — GABAPENTIN 100 MG: 100 CAPSULE ORAL at 22:02

## 2020-12-01 RX ADMIN — DEXAMETHASONE SODIUM PHOSPHATE 4 ML: 4 INJECTION, SOLUTION INTRA-ARTICULAR; INTRALESIONAL; INTRAMUSCULAR; INTRAVENOUS; SOFT TISSUE at 09:56

## 2020-12-01 RX ADMIN — IBUPROFEN 400 MG: 400 TABLET ORAL at 22:01

## 2020-12-01 RX ADMIN — MIDAZOLAM 1 MG: 1 INJECTION INTRAMUSCULAR; INTRAVENOUS at 09:56

## 2020-12-01 RX ADMIN — CEFAZOLIN SODIUM 2 G: 2 INJECTION, SOLUTION INTRAVENOUS at 09:56

## 2020-12-01 RX ADMIN — DEXAMETHASONE SODIUM PHOSPHATE 4 MG: 4 INJECTION, SOLUTION INTRA-ARTICULAR; INTRALESIONAL; INTRAMUSCULAR; INTRAVENOUS; SOFT TISSUE at 10:03

## 2020-12-01 RX ADMIN — ASPIRIN 325 MG: 325 TABLET, COATED ORAL at 22:02

## 2020-12-01 RX ADMIN — LIDOCAINE HYDROCHLORIDE 0.1 ML: 10 INJECTION, SOLUTION EPIDURAL; INFILTRATION; INTRACAUDAL; PERINEURAL at 08:51

## 2020-12-01 RX ADMIN — PROPOFOL 100 MCG/KG/MIN: 10 INJECTION, EMULSION INTRAVENOUS at 10:02

## 2020-12-01 RX ADMIN — SODIUM CHLORIDE, POTASSIUM CHLORIDE, SODIUM LACTATE AND CALCIUM CHLORIDE: 600; 310; 30; 20 INJECTION, SOLUTION INTRAVENOUS at 11:03

## 2020-12-01 RX ADMIN — GLYCOPYRROLATE 0.2 MG: 0.2 INJECTION, SOLUTION INTRAMUSCULAR; INTRAVENOUS at 10:22

## 2020-12-01 RX ADMIN — LIDOCAINE HYDROCHLORIDE 60 MG: 20 INJECTION, SOLUTION INFILTRATION; PERINEURAL at 10:02

## 2020-12-01 RX ADMIN — POLYETHYLENE GLYCOL 3350 17 G: 17 POWDER, FOR SOLUTION ORAL at 13:28

## 2020-12-01 RX ADMIN — HYDROCODONE BITARTRATE AND ACETAMINOPHEN 2 TABLET: 5; 325 TABLET ORAL at 15:19

## 2020-12-01 RX ADMIN — ACETAMINOPHEN 975 MG: 325 TABLET, FILM COATED ORAL at 08:11

## 2020-12-01 RX ADMIN — HYDROCODONE BITARTRATE AND ACETAMINOPHEN 2 TABLET: 5; 325 TABLET ORAL at 23:32

## 2020-12-01 RX ADMIN — TRANEXAMIC ACID 1950 MG: 650 TABLET ORAL at 08:12

## 2020-12-01 RX ADMIN — LIDOCAINE HYDROCHLORIDE 1 ML: 10 INJECTION, SOLUTION INFILTRATION; PERINEURAL at 09:56

## 2020-12-01 RX ADMIN — ONDANSETRON 4 MG: 2 INJECTION INTRAMUSCULAR; INTRAVENOUS at 09:55

## 2020-12-01 RX ADMIN — POVIDONE-IODINE: 10 SOLUTION TOPICAL at 11:06

## 2020-12-01 RX ADMIN — MULTIPLE VITAMINS W/ MINERALS TAB 1 TABLET: TAB at 15:15

## 2020-12-01 RX ADMIN — DEXAMETHASONE SODIUM PHOSPHATE 4 MG: 10 INJECTION, SOLUTION INTRAMUSCULAR; INTRAVENOUS at 09:10

## 2020-12-01 RX ADMIN — PHENYLEPHRINE HYDROCHLORIDE 50 MCG: 10 INJECTION INTRAVENOUS at 10:59

## 2020-12-01 RX ADMIN — EPINEPHRINE 20 ML: 1 INJECTION, SOLUTION, CONCENTRATE INTRAVENOUS at 09:10

## 2020-12-01 RX ADMIN — HYDROCODONE BITARTRATE AND ACETAMINOPHEN 2 TABLET: 5; 325 TABLET ORAL at 19:42

## 2020-12-01 RX ADMIN — SODIUM CHLORIDE, POTASSIUM CHLORIDE, SODIUM LACTATE AND CALCIUM CHLORIDE 100 ML/HR: 600; 310; 30; 20 INJECTION, SOLUTION INTRAVENOUS at 08:51

## 2020-12-01 RX ADMIN — BUPIVACAINE HYDROCHLORIDE IN DEXTROSE 1.8 ML: 7.5 INJECTION, SOLUTION SUBARACHNOID at 10:00

## 2020-12-01 RX ADMIN — PHENYLEPHRINE HYDROCHLORIDE 50 MCG: 10 INJECTION INTRAVENOUS at 10:53

## 2020-12-01 RX ADMIN — DOCUSATE SODIUM 50 MG AND SENNOSIDES 8.6 MG 2 TABLET: 8.6; 5 TABLET, FILM COATED ORAL at 22:01

## 2020-12-01 RX ADMIN — ASPIRIN 325 MG: 325 TABLET, COATED ORAL at 13:28

## 2020-12-01 RX ADMIN — CEFAZOLIN SODIUM 1 G: 1 INJECTION, SOLUTION INTRAVENOUS at 17:23

## 2020-12-01 RX ADMIN — MIDAZOLAM 1 MG: 1 INJECTION INTRAMUSCULAR; INTRAVENOUS at 08:59

## 2020-12-01 SDOH — HEALTH STABILITY: MENTAL HEALTH: CURRENT SMOKER: 0

## 2020-12-01 ASSESSMENT — ACTIVITIES OF DAILY LIVING (ADL)
CONCENTRATING,_REMEMBERING_OR_MAKING_DECISIONS_DIFFICULTY: NO
HEARING_DIFFICULTY_OR_DEAF: YES
WEAR_GLASSES_OR_BLIND: YES
PATIENT'S_PREFERRED_MEANS_OF_COMMUNICATION: VERBAL
USE_OF_HEARING_ASSISTIVE_DEVICES: RIGHT HEARING AID;LEFT HEARING AID
ADLS_ACUITY_SCORE: 16
DIFFICULTY_COMMUNICATING: NO
TOILETING_ISSUES: NO
PATIENT_/_FAMILY_COMMUNICATION_STYLE: SPOKEN LANGUAGE (ENGLISH OR BILINGUAL)
ADLS_ACUITY_SCORE: 16
DRESSING/BATHING_DIFFICULTY: NO
DESCRIBE_HEARING_LOSS: BILATERAL HEARING LOSS
DOING_ERRANDS_INDEPENDENTLY_DIFFICULTY: NO
FALL_HISTORY_WITHIN_LAST_SIX_MONTHS: NO
DIFFICULTY_EATING/SWALLOWING: NO
WALKING_OR_CLIMBING_STAIRS_DIFFICULTY: NO
WERE_AUXILIARY_AIDS_OFFERED?: NO
EQUIPMENT_CURRENTLY_USED_AT_HOME: WALKER, ROLLING;CANE, STRAIGHT
VISION_MANAGEMENT: FOR READING

## 2020-12-01 ASSESSMENT — MIFFLIN-ST. JEOR: SCORE: 1060.56

## 2020-12-01 NOTE — ANESTHESIA PROCEDURE NOTES
Procedure note : intrathecal      Staff -   CRNA: Rodriguez Leblanc APRN CRNA  Performed By: CRNA  Pre-Procedure    Location: OR    Procedure Times:12/1/2020 9:55 AM and 12/1/2020 10:00 AM  Pre-Anesthestic Checklist: patient identified, IV checked, risks and benefits discussed, informed consent, monitors and equipment checked, pre-op evaluation, at physician/surgeon's request and post-op pain management    Timeout  Correct Patient: Yes   Correct Procedure: Yes   Correct Site: Yes   Correct Laterality: Yes   Correct Position: Yes     .   Procedure Documentation  ASA 3  Diagnosis:hip Pain.    Procedure: intrathecal, .   Patient Position:sitting Insertion Site:L3-4  (midline approach)     Patient Prep/Sterile Barriers; mask, sterile gloves, chlorhexidine gluconate and isopropyl alcohol, patient draped.  .  Needle:  Spinal Needle (gauge): 25  Spinal/LP Needle Length (inches): 3.5 # of attempts: 1 and # of redirects:  Introducer used Introducer: 20 G .        Assessment/Narrative  Paresthesias: No.  .  .  clear CSF fluid removed . Time Injected: 10:00while sitting

## 2020-12-01 NOTE — BRIEF OP NOTE
[facility]    Brief Operative Note    Pre-operative diagnosis: Arthritis of left hip [M16.12]  Post-operative diagnosis Same as pre-operative diagnosis    Procedure: Procedure(s):  ARTHROPLASTY, HIP, TOTAL, DIRECT ANTERIOR APPROACH  Surgeon: Surgeon(s) and Role:     * Amrik Lu MD - Primary     * Marco A Beavers PA-C - Assisting  Anesthesia: Spinal   Estimated blood loss: 200 ml  Drains: None  Specimens: * No specimens in log *  Findings:   None.  Complications: None.  Implants:   Implant Name Type Inv. Item Serial No.  Lot No. LRB No. Used Action   acetabular system shell with cluster holes    GEOVANI [id number] Left 1 Implanted   IMP SCR ZIM 6.5X20MM ACET CUP SELF TAP -783-20 Metallic Hardware/[place] IMP SCR ZIM 6.5X20MM ACET CUP SELF TAP -132-20  [employer] [id number] Left 1 Implanted   IMP SCR ZIM 6.5X40MM ACET CUP SELF TAP -959-40 Metallic Hardware/[place] IMP SCR ZIM 6.5X40MM ACET CUP SELF TAP -834-40  [employer] [id number] Left 1 Implanted   acetabular system neutral liner    [employer]OVANI [id number] Left 1 Implanted   IMP STEM FEM BIOM TAPERLOC STD OFFSET TYPE 1 [id number] Total Joint Component/Insert IMP STEM FEM BIOM TAPERLOC STD OFFSET TYPE 1 SZ10 [id number]  [employer] [id number] Left 1 Implanted   Ceramic head     BIOMET [id number] Left 1 Implanted   Taper sleeve     BIOMET [id number] Left 1 Implanted

## 2020-12-01 NOTE — INTERVAL H&P NOTE
Brief History of Illness:   Bethany Hoyt is a 80 year old female who was admitted for left hip pain    H&P reviewed and patient examined with no new updates from prior exam

## 2020-12-01 NOTE — ANESTHESIA POSTPROCEDURE EVALUATION
Patient: Bethany oHyt    Procedure(s):  ARTHROPLASTY, HIP, TOTAL, DIRECT ANTERIOR APPROACH    Diagnosis:Arthritis of left hip [M16.12]  Diagnosis Additional Information: No value filed.    Anesthesia Type:  Spinal, Peripheral Nerve Block    Note:  Anesthesia Post Evaluation    Patient location during evaluation: PACU  Patient participation: Able to fully participate in evaluation  Level of consciousness: awake and alert  Pain management: adequate  Airway patency: patent  Cardiovascular status: acceptable  Respiratory status: acceptable  Hydration status: acceptable  PONV: none             Last vitals:  Vitals:    12/01/20 1145 12/01/20 1150 12/01/20 1155   BP: 115/52 (!) 88/71 103/66   Pulse: 56 67 55   Resp: 16 10 16   Temp:      SpO2:            Electronically Signed By: JENA YORK CRNA  December 1, 2020  12:37 PM

## 2020-12-01 NOTE — ANESTHESIA CARE TRANSFER NOTE
Patient: Bethany Hoyt    Procedure(s):  ARTHROPLASTY, HIP, TOTAL, DIRECT ANTERIOR APPROACH    Diagnosis: Arthritis of left hip [M16.12]  Diagnosis Additional Information: No value filed.    Anesthesia Type:   Spinal, Peripheral Nerve Block     Note:  Airway :Nasal Cannula  Patient transferred to:PACU  Handoff Report: Identifed the Patient, Identified the Reponsible Provider, Reviewed the pertinent medical history, Discussed the surgical course, Reviewed Intra-OP anesthesia mangement and issues during anesthesia, Set expectations for post-procedure period and Allowed opportunity for questions and acknowledgement of understanding      Vitals: (Last set prior to Anesthesia Care Transfer)    CRNA VITALS  12/1/2020 1047 - 12/1/2020 1118      12/1/2020             Resp Rate (set):  10                Electronically Signed By: JENA Paulino CRNA  December 1, 2020  11:18 AM

## 2020-12-01 NOTE — OP NOTE
Procedure Date: 12/01/2020      PREOPERATIVE DIAGNOSIS:  Left hip degenerative arthrosis.      POSTOPERATIVE DIAGNOSIS:  Left hip degenerative arthrosis.      PROCEDURE PERFORMED:  Left total hip replacement, direct anterior.      ASSISTANT:  Marco A Beavers PA-C.      ANESTHESIA:  Spinal.      COMPLICATIONS:  Without.      INDICATIONS:  Evelina is an 80-year-old with history of left hip pain, progressive in nature, unresponsive to conservative measures.  Recommendation for total joint reconstruction.  The patient did elect to proceed following a discussion of the risks and benefits.      OPERATIVE PROCEDURE:  The patient was taken to the operative suite, administered anesthesia.  Following spinalization, placed about the [place] table.  Left lower extremity prepped and draped in normal sterile fashion.  Preoperative antibiotics administered.  Timeout was called.  At this point in time, a 10 cm incision made overlying the TFL fascia, dissected down to the fascia, opened up the interval between the tensor and sartorius, elevated the rectus off the anterior capsule, did a capsulotomy, did femoral head and neck resection at the pretemplate level after femoral head and neck was removed, we placed proper retraction about the acetabulum, reamed to 51, implanted a 52 Continuum cup, used 2 screws for fixation.  After fixation, we placed a polyethylene liner at which time we then did a capsular release about the proximal femur, at which time we then put the leg in external rotation, extension and adduction following proper soft tissue releases.  Used the canal finder followed by lateralization, followed by sequential broaching to a size initially a 9.  We analyzed that, broached to go up 1 size, selected our offset.  As such, broached up to a 10 and implanted 10 standard offset Taperloc stem and then trialed both a 0 and +3.  The 0 and +3 were both stable.  We felt we had a better restoration of leg length with a +0, 36 ceramic  ball.  As such, we placed this reduced the hip, copiously irrigated.  Hemostasis achieved with bipolar cautery, closed the fascia with #1 Vicryl, followed by 2-0 Vicryl, skin staples, Dermabond and Aquacel dressing.  The patient was then transferred to recovery room in stable condition.      POSTOPERATIVE PLAN:  Likely discharge on postop day 1 or 2.  Aspirin 325 p.o. b.i.d. for DVT prophylaxis, Norco for pain management.  Staples out at 2 weeks.      A skilled first assistant was necessary for position, intraoperative decision making, retraction and exposure during the procedure.      Furthermore, a skilled first assistant was necessary to complete the procedure in a technically safe and efficient manner.      A surgical assistant during IMPLANTS:   1.  A 52 Continuum cup Lisbeth.   2.  Taperloc stem size 10 standard offset.   3.  A 0/36 ceramic ball.         AZUCENA BOOGIE MD             D: 2020   T: 2020   MT: JORGE ALBERTO      Name:     CORINNE KING   MRN:      [id number]        Account:        [id number]   :      1940           Procedure Date: 2020      Document: [id number]

## 2020-12-01 NOTE — PROGRESS NOTES
Patient transferred from PACU and admitted to room 313 status post Left total hip replacement. Patient orientated to room and call light placed within reach. Patient denied current questions or concerns.

## 2020-12-01 NOTE — ANESTHESIA PREPROCEDURE EVALUATION
Anesthesia Pre-Procedure Evaluation    Patient: Bethany Hoyt   MRN: [id number] : 1940          Preoperative Diagnosis: Arthritis of left hip [M16.12]    Procedure(s):  ARTHROPLASTY, HIP, TOTAL, DIRECT ANTERIOR APPROACH    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     No Comments Provided     Bilateral carpal tunnel syndrome     No Comments Provided     Contact dermatitis     2011     Disease of pericardium     No Comments Provided     Disorder of cartilage     2011     Diverticulosis of intestine without perforation or abscess without bleeding     No Comments Provided     Diverticulosis of large intestine without perforation or abscess without bleeding     3/26/2012     History of colonic polyps     3/27/2012     Localized swelling, mass and lump, left upper limb     2013     Malignant neoplasm of female breast (H)     ,estrogen receptor borderline, progesterone receptor positive, treated with Tamoxifen 10 mg. b.i.d. times five years.  Status post chemotherapy times one and Tamoxifen times five years.     Osteoarthritis of hip     10/7/2011     Personal history of malignant neoplasm of breast     2011     Past Surgical History:   Procedure Laterality Date     AS TOTAL HIP ARTHROPLASTY Right 2015     BACK SURGERY           COLONOSCOPY      2002,Diverticulosis     COLONOSCOPY      3/26/2012,Follow up 2017     COLONOSCOPY      2017,F/U N/A     MASTECTOMY SIMPLE      ,Right     OTHER SURGICAL HISTORY      [id number],OTHER,left index finger, [facility]     RELEASE CARPAL TUNNEL      ,Right     RELEASE CARPAL TUNNEL      ,Left       Anesthesia Evaluation     . Pt has had prior anesthetic. Type: General, MAC and Regional           ROS/MED HX    ENT/Pulmonary:  - neg pulmonary ROS     Neurologic:  - neg neurologic ROS     Cardiovascular:  - neg cardiovascular ROS       METS/Exercise Tolerance:  4 - Raking leaves,  "gardening   Hematologic:  - neg hematologic  ROS       Musculoskeletal:   (+) arthritis,  -       GI/Hepatic:  - neg GI/hepatic ROS       Renal/Genitourinary:  - ROS Renal section negative       Endo:  - neg endo ROS       Psychiatric:     (+) psychiatric history anxiety      Infectious Disease:  - neg infectious disease ROS       Malignancy:   (+) Malignancy History of Breast  Breast CA status post Surgery.         Other:    - neg other ROS                      Physical Exam  Normal systems: pulmonary    Airway   Mallampati: I  TM distance: >3 FB  Neck ROM: full    Dental   (+) upper dentures    Cardiovascular   Rhythm and rate: regular and normal      Pulmonary             Lab Results   Component Value Date    WBC 4.3 11/11/2020    HGB 12.8 11/11/2020    HCT 40.4 11/11/2020     11/11/2020    SED 6 05/30/2014     11/11/2020    POTASSIUM 4.1 11/11/2020    CHLORIDE 102 11/11/2020    CO2 29 11/11/2020    BUN 20 11/11/2020    CR 0.75 11/11/2020    GLC 89 11/11/2020    CARTER 9.4 11/11/2020    ALBUMIN 4.3 10/09/2019    PROTTOTAL 7.4 10/09/2019    ALT 10 10/09/2019    AST 15 10/09/2019    ALKPHOS 57 10/09/2019    BILITOTAL 0.4 10/09/2019    INR 1.0 05/30/2014       Preop Vitals  BP Readings from Last 3 Encounters:   12/01/20 135/77   11/11/20 126/74   10/14/20 120/70    Pulse Readings from Last 3 Encounters:   11/11/20 62   10/14/20 62   09/03/20 80      Resp Readings from Last 3 Encounters:   12/01/20 12   11/11/20 16   09/03/20 16    SpO2 Readings from Last 3 Encounters:   12/01/20 98%   11/11/20 98%   09/03/20 96%      Temp Readings from Last 1 Encounters:   12/01/20 98.5  F (36.9  C) (Tympanic)    Ht Readings from Last 1 Encounters:   12/01/20 1.651 m (5' 5\")      Wt Readings from Last 1 Encounters:   12/01/20 59 kg (130 lb)    Estimated body mass index is 21.63 kg/m  as calculated from the following:    Height as of this encounter: 1.651 m (5' 5\").    Weight as of this encounter: 59 kg (130 lb). "       Anesthesia Plan      History & Physical Review      ASA Status:  2 .    NPO Status:  > 8 hours    Plan for Spinal and Peripheral Nerve Block with Propofol induction. Maintenance will be Balanced.    PONV prophylaxis:  Ondansetron (or other 5HT-3) and Dexamethasone or Solumedrol    The patient is not a current smoker      Postoperative Care  Postoperative pain management:  IV analgesics, Multi-modal analgesia, Peripheral nerve block (Single Shot) and Neuraxial analgesia.      Consents  Anesthetic plan, risks, benefits and alternatives discussed with:  Patient and Spouse.  Use of blood products discussed: Yes.   Use of blood products discussed with Patient and Spouse.  Consented to blood products.  .                 JENA YORK CRNA

## 2020-12-01 NOTE — OR NURSING
PACU Transfer Note    Bethany Hoyt was transferred to [facility] via cart.  Equipment used for transport:  none.  Accompanied by:  Rn  Prescriptions were: none report  To Angela    PACU Respiratory Event Documentation     1) Episodes of Apnea greater than or equal to 10 seconds: no    2) Bradypnea - less than 8 breaths per minute: no    3) Pain score on 0 to 10 scale: no    4) Pain-sedation mismatch (yes or no): no    5) Repeated 02 desaturation less than 90% (yes or no): no    Anesthesia notified? (yes or no): no    Any of the above events occuring repeatedly in separate 30 minute intervals may be considered recurrent PACU respiratory events.    Patient stable and meets phase 1 discharge criteria for transport from PACU.  Good pedal pulses good capillary refill less than 3sec

## 2020-12-01 NOTE — CONSULTS
[facility]    Hospitalist Consultation    Date of Admission:  12/1/2020    Assessment & Plan   Bethany Hoyt is a 80 year old female who was admitted on 12/1/2020. I was asked to see the patient for perioperative comanagement.    Principal Problem:    Status post total replacement of left hip    Assessment: Postop day 0 and tolerated the procedure well without difficulties    Plan:   -Pain control, activity restrictions, ASA twice daily for dvt ppx per orthopedics  -PT/OT in a.m.  -Daily hemoglobin    FEN: regular diet, LR at 75mL/hr  PPX: SCD's     Code Status: Full Code    Neri Carolina    Reason for Consult   Reason for consult: I was asked by Aly to evaluate this patient for perioperative comanagement.    Primary Care Physician   Yasmin Jesus    Chief Complaint   Knee replacement    History is obtained from the patient and chart review    History of Present Illness   Bethany Hoyt is a 80 year old female who presents  for elective total knee arthroplasty with Dr. Lu.  Patient underwent preoperative evaluation on 11/11 with Dr. Best, she tolerated procedure well without difficulty today.    Past Medical History    I have reviewed this patient's medical history and updated it with pertinent information if needed.   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     No Comments Provided     Bilateral carpal tunnel syndrome     No Comments Provided     Contact dermatitis     1/24/2011     Disease of pericardium     No Comments Provided     Disorder of cartilage     1/24/2011     Diverticulosis of intestine without perforation or abscess without bleeding     No Comments Provided     Diverticulosis of large intestine without perforation or abscess without bleeding     3/26/2012     History of colonic polyps     3/27/2012     Localized swelling, mass and lump, left upper limb     9/27/2013     Malignant neoplasm of female breast  (H)     1990,estrogen receptor borderline, progesterone receptor positive, treated with Tamoxifen 10 mg. b.i.d. times five years.  Status post chemotherapy times one and Tamoxifen times five years.     Osteoarthritis of hip     10/7/2011     Personal history of malignant neoplasm of breast     1/24/2011       Past Surgical History   I have reviewed this patient's surgical history and updated it with pertinent information if needed.  Past Surgical History:   Procedure Laterality Date     AS TOTAL HIP ARTHROPLASTY Right 2015     BACK SURGERY      1959     COLONOSCOPY      5/2002,Diverticulosis     COLONOSCOPY      3/26/2012,Follow up 2017     COLONOSCOPY      06/19/2017,F/U N/A     MASTECTOMY SIMPLE      1990,Right     OTHER SURGICAL HISTORY      9/30/13,259012,OTHER,left index finger, [place]     RELEASE CARPAL TUNNEL      2005,Right     RELEASE CARPAL TUNNEL      2010,Left       Prior to Admission Medications   Prior to Admission Medications   Prescriptions Last Dose Informant Patient Reported? Taking?   Multiple Vitamins-Minerals (MULTI COMPLETE PO) 11/27/2020 at Unknown time Self Yes Yes   Sig: Take 1 tablet by mouth daily   acetaminophen (TYLENOL) 500 MG tablet 11/30/2020 at 2000 Self Yes Yes   Sig: Take 500-1,000 mg by mouth every 6 hours as needed for mild pain   ascorbic acid (VITAMIN C) 500 MG tablet 11/27/2020 at AM Self Yes Yes   Sig: Take 500 mg by mouth daily   calcium carbonate-vitamin D (OS-CARTER) 500-400 MG-UNIT tablet 11/27/2020 at AM Self Yes Yes   Sig: Take 1 tablet by mouth daily   ibuprofen (ADVIL/MOTRIN) 200 MG capsule Past Week at PM Self Yes Yes   Sig: Take 400 mg by mouth At Bedtime    vitamin E (TOCOPHEROL) 400 units (180 mg) capsule 11/27/2020 at AM Self Yes Yes   Sig: Take 400 Units by mouth daily      Facility-Administered Medications: None     Allergies   No Known Allergies    Social History   I have reviewed this patient's social history and updated it with pertinent information if needed.  Bethany Hoyt  reports that she has never smoked. She has never used smokeless tobacco. She reports that she does not drink alcohol or use drugs.    Family History   I have reviewed this patient's family history and updated it with pertinent information if needed.   Family History   Problem Relation Age of Onset     Other - See Comments Father 85        Alzheimer's/Pneumonia     Cancer Mother         Cancer,Renal metastized to lung     Colon Cancer Brother         Cancer-colon     Anesthesia Reaction No family hx of         Anesthesia Problem     Blood Disease No family hx of         Blood Disease     Heart Disease No family hx of         Heart Disease     Diabetes No family hx of         Diabetes     Breast Cancer No family hx of         Cancer-breast       Review of Systems     Constitutional: normal energy and appetite, no recent sick contacts, no flulike symptoms.  Eyes: no changes in vision  Ears, nose, mouth, throat, and face: no mouth sores, dysphagia, or odynophagia  Respiratory: no shortness of breath, cough, or wheezing.   Cardiovascular: no chest pain,palpitations, orthopnea, increased lower extremity edema, or syncope.   Gastrointestinal: no constipation, diarrhea, nausea, vomiting or abdominal pain.  Genitourinary: no dysuria, hematuria, urgency or frequency.   Musculoskeletal: Pain currently well controlled, her block is starting to wear off.   Neurological: no new weakness, tingling, numbness.   Endocrine: not a known diabetic.     Physical Exam   Temp: 97.8  F (36.6  C) Temp src: Tympanic BP: 91/48 Pulse: 74   Resp: 16 SpO2: 94 % O2 Device: None (Room air)    Vital Signs with Ranges  Temp:  [97.1  F (36.2  C)-98.5  F (36.9  C)] 97.8  F (36.6  C)  Pulse:  [55-86] 74  Resp:  [10-16] 16  BP: ()/() 91/48  FiO2 (%):  [3 %] 3 %  SpO2:  [91 %-100 %] 94 %  130 lbs 0 oz    Exam:  GENERAL: Talkative, in no apparent distress.  Head: normocephalic and atraumatic  Eyes: anicteric and non-injected  sclera  Nose: no rhinorrhea or epistaxis.   Throat: moist mucous membranes with no active oral lesions.  NECK: Supple, jugular venous distension not present.  CARDIOVASCULAR: regular rate and rhythm, no murmurs, rubs, or gallops. Normal S1/S2. No lower extremity edema.   RESPIRATORY: clear to auscultation bilaterally, no wheezes, no crackles.  No accessory muscle use or evidence of respiratory distress.   GI: soft, non-tender, non-distended, normoactive bowel sounds.  MUSCULOSKELETAL: Left hip with Aquacel dressing in place at the anterior aspect, no underlying spotting, CMS intact to left foot.    SKIN: no pallor, jaundice or rashes.  NEUROLOGY: AAOx3, follows commands, speech and language without focal deficits.       Data   -Data reviewed today: All pertinent laboratory and imaging results from this encounter were reviewed. I personally reviewed no images or EKG's today.  No lab results found in last 7 days.    Recent Results (from the past 24 hour(s))   XR Pelvis Port 1/2 Views    Narrative    PROCEDURE: XR PELVIS PORT 1/2 VW 12/1/2020 11:27 AM    HISTORY: POST AP pelvis in OR post THR    COMPARISONS: 9/3/2020.    TECHNIQUE: AP view of the pelvis.    FINDINGS: There are bilateral hip arthroplasties. The one on the left  is new. There are skin staples in place.    No acute bony abnormality is seen.         Impression    IMPRESSION: Bilateral hip arthroplasties.    RENAN BENITO MD   XR Surgery HATTIE L/T 5 Min Fluoro    Narrative    This exam was marked as non-reportable because it will not be read by a   radiologist or a [place] non-radiologist provider.

## 2020-12-01 NOTE — ANESTHESIA PROCEDURE NOTES
Procedure note :      Staff -   CRNA: Antoinette James APRN CRNA  Performed By: CRNA  Pre-Procedure    Location: pre-op   Procedure Times:12/1/2020 8:59 AM and 12/1/2020 9:10 AM  Pre-Anesthestic Checklist: patient identified, IV checked, site marked, risks and benefits discussed, informed consent, monitors and equipment checked, pre-op evaluation, at physician/surgeon's request and post-op pain management    Timeout  Correct Patient: Yes   Correct Procedure: Yes   Correct Site: Yes   Correct Laterality: Yes   Correct Position: Yes   Site Marked: Yes   .   Procedure Documentation    Diagnosis:LEFT HIP ARTHROPLASTY.    Procedure: left.   Patient Position:supine   Ultrasound used to identify targeted nerve, plexus, or vascular marker and placed a needle adjacent to it., Ultrasound was used to visualize the spread of the anesthetic in close proximity to the above stated nerve. A permanent image is entered into the patient's record.  Patient Prep/Sterile Barriers; mask, sterile gloves, chlorhexidine gluconate and isopropyl alcohol, patient draped.  .  Needle: insulated   Needle Gauge: 22.    Needle Length (Inches) 4   .        Assessment/Narrative  Paresthesias: No.  Injection made incrementally with aspirations every 5 mL..  The placement was negative for: blood aspirated, painful injection and site bleeding.  .     Nerve block type: PENG.

## 2020-12-01 NOTE — PHARMACY-ADMISSION MEDICATION HISTORY
Pharmacy -- Admission Medication Reconciliation    Prior to admission (PTA) medications were reviewed and the patient's PTA medication list was updated.    Sources Consulted: Patient phone interview, Chart notes, SureScripts (no data)    The reliability of this Medication Reconciliation is: Reliability: Reliable    The following significant changes were made:  -Updated last (approximate) home administration times  -Removed Vitamin D capsule (patient taking Calcium/Vit. D combination tablet)  -Added Calcium/Vitamin D  -Added Acetaminophen  -Changed Ibuprofen dosing  -Added Vitamin E  -Removed psyllium[    **Notes: patient denies any chronic prescription items  -patient states she also takes tablets to help her keep her regular, but is unsure of the name of the drug, product, or strength. She states that it is a little capsule- taking 2 capsules BID. I suspect that this item is docusate 50 mg or 100 mg, but as patient is unsure of name or strength, I did not add this to patient's PTA medication list.    In addition, the patient's allergies were reviewed with the patient and left as follows:   Allergies: Patient has no known allergies.    The pharmacist has reviewed with the patient that all personal medications should be removed from the building or locked in the belongings safe.  Patient shall only take medications ordered by the physician and administered by the nursing staff.       Medication barriers identified: none noted   Medication adherence concerns: none noted   Understanding of emergency medications: N/A    Bill Ortiz RPH, 12/1/2020,  1:14 PM

## 2020-12-02 ENCOUNTER — APPOINTMENT (OUTPATIENT)
Dept: PHYSICAL THERAPY | Facility: OTHER | Age: 80
DRG: 470 | End: 2020-12-02
Attending: ORTHOPAEDIC SURGERY
Payer: COMMERCIAL

## 2020-12-02 ENCOUNTER — APPOINTMENT (OUTPATIENT)
Dept: OCCUPATIONAL THERAPY | Facility: OTHER | Age: 80
DRG: 470 | End: 2020-12-02
Attending: ORTHOPAEDIC SURGERY
Payer: COMMERCIAL

## 2020-12-02 LAB — HGB BLD-MCNC: 7.6 G/DL (ref 11.7–15.7)

## 2020-12-02 PROCEDURE — 97535 SELF CARE MNGMENT TRAINING: CPT | Mod: GO

## 2020-12-02 PROCEDURE — 97165 OT EVAL LOW COMPLEX 30 MIN: CPT | Mod: GO

## 2020-12-02 PROCEDURE — 250N000013 HC RX MED GY IP 250 OP 250 PS 637: Performed by: INTERNAL MEDICINE

## 2020-12-02 PROCEDURE — 999N000157 HC STATISTIC RCP TIME EA 10 MIN

## 2020-12-02 PROCEDURE — 99232 SBSQ HOSP IP/OBS MODERATE 35: CPT | Performed by: INTERNAL MEDICINE

## 2020-12-02 PROCEDURE — 97530 THERAPEUTIC ACTIVITIES: CPT | Mod: GP

## 2020-12-02 PROCEDURE — 97110 THERAPEUTIC EXERCISES: CPT | Mod: GP

## 2020-12-02 PROCEDURE — 250N000011 HC RX IP 250 OP 636: Performed by: ORTHOPAEDIC SURGERY

## 2020-12-02 PROCEDURE — 97116 GAIT TRAINING THERAPY: CPT | Mod: GP

## 2020-12-02 PROCEDURE — 250N000013 HC RX MED GY IP 250 OP 250 PS 637: Performed by: ORTHOPAEDIC SURGERY

## 2020-12-02 PROCEDURE — 120N000001 HC R&B MED SURG/OB

## 2020-12-02 PROCEDURE — 36415 COLL VENOUS BLD VENIPUNCTURE: CPT | Performed by: ORTHOPAEDIC SURGERY

## 2020-12-02 PROCEDURE — 85018 HEMOGLOBIN: CPT | Performed by: ORTHOPAEDIC SURGERY

## 2020-12-02 RX ORDER — FERROUS SULFATE 325(65) MG
325 TABLET, DELAYED RELEASE (ENTERIC COATED) ORAL 2 TIMES DAILY WITH MEALS
Status: DISCONTINUED | OUTPATIENT
Start: 2020-12-02 | End: 2020-12-03 | Stop reason: HOSPADM

## 2020-12-02 RX ADMIN — HYDROCODONE BITARTRATE AND ACETAMINOPHEN 1 TABLET: 5; 325 TABLET ORAL at 07:40

## 2020-12-02 RX ADMIN — FERROUS SULFATE TAB EC 325 MG (65 MG FE EQUIVALENT) 325 MG: 325 (65 FE) TABLET DELAYED RESPONSE at 17:45

## 2020-12-02 RX ADMIN — DOCUSATE SODIUM 50 MG AND SENNOSIDES 8.6 MG 2 TABLET: 8.6; 5 TABLET, FILM COATED ORAL at 20:38

## 2020-12-02 RX ADMIN — CEFAZOLIN SODIUM 1 G: 1 INJECTION, SOLUTION INTRAVENOUS at 01:55

## 2020-12-02 RX ADMIN — MULTIPLE VITAMINS W/ MINERALS TAB 1 TABLET: TAB at 10:22

## 2020-12-02 RX ADMIN — ASPIRIN 325 MG: 325 TABLET, COATED ORAL at 10:22

## 2020-12-02 RX ADMIN — GABAPENTIN 100 MG: 100 CAPSULE ORAL at 20:38

## 2020-12-02 RX ADMIN — FERROUS SULFATE TAB EC 325 MG (65 MG FE EQUIVALENT) 325 MG: 325 (65 FE) TABLET DELAYED RESPONSE at 08:00

## 2020-12-02 RX ADMIN — ASPIRIN 325 MG: 325 TABLET, COATED ORAL at 20:38

## 2020-12-02 RX ADMIN — HYDROCODONE BITARTRATE AND ACETAMINOPHEN 1 TABLET: 5; 325 TABLET ORAL at 20:37

## 2020-12-02 RX ADMIN — DOCUSATE SODIUM 50 MG AND SENNOSIDES 8.6 MG 2 TABLET: 8.6; 5 TABLET, FILM COATED ORAL at 10:22

## 2020-12-02 RX ADMIN — IBUPROFEN 400 MG: 400 TABLET ORAL at 20:38

## 2020-12-02 RX ADMIN — HYDROCODONE BITARTRATE AND ACETAMINOPHEN 1 TABLET: 5; 325 TABLET ORAL at 13:44

## 2020-12-02 RX ADMIN — DIPHENHYDRAMINE HYDROCHLORIDE 12.5 MG: 25 LIQUID ORAL at 02:00

## 2020-12-02 RX ADMIN — POLYETHYLENE GLYCOL 3350 17 G: 17 POWDER, FOR SOLUTION ORAL at 10:21

## 2020-12-02 ASSESSMENT — ACTIVITIES OF DAILY LIVING (ADL)
ADLS_ACUITY_SCORE: 16
ADLS_ACUITY_SCORE: 19
ADLS_ACUITY_SCORE: 16
ADLS_ACUITY_SCORE: 16

## 2020-12-02 NOTE — PROGRESS NOTES
12/02/20 0800   Signing Clinician's Name / Credentials   Signing clinician's name / credentials Brina Haley OTR/L   Quick Adds   Rehab Discipline OT   ADL Training   Minutes of Treatment 25   Treatment Detail Patient completes sit to stand transfer from bedside recliner with CGA x 1.  Completes functional mobility in room with CGA x 1 and FWW.  Completes bed mobility sit to supine with Min A x 1.    Grooming Training   [place] Level (Grooming Training) stand-by assist   Assistance (Grooming Training) supervision   Treatment Detail Completes hand washing standing at sink.   Toilet Training   [place] Level (Toilet Training) contact guard   Assistance (Toilet Training) 1 person assist   Toilet Training Assistance - Assistive Device wall grab bar   Treatment Detail Completes toileting with VCs and CGA for proper use of walker and grab bar.     OT Discharge Planning    OT Discharge Recommendation (DC Rec) Home with assist   OT Rationale for DC Rec Patient is doing well s/p anterior L NARENDRA.  She will have assistance at home from her daughter and granddaughters for about 1 week upon return to home and her  will be able to assist as needed there after.  She may benefit from a shower chair.    OT Brief overview of current status  Patient is doing well upon OT eval.  She is able to transfer from bedside recliner with CGA x 1 with FWW and ambulate in room to bathroom with same assist.  Able to complete toileting and hand washing at sink with CGA x 1 and use of grab bar at toilet.  She does get slightly nauseated upon completion of minimal self-cares/functional mobility and returns to bed.  She required Min A x 1 for sit to supine.     Additional Documentation   OT Plan continue OT   Total Session Time   Total Session Time (minutes) 40 minutes

## 2020-12-02 NOTE — PROGRESS NOTES
Subjective: The patient is POD #1 s/p L Total Hip Arthroplasty.  The patients pain is controlled fairly well.  They deny shortness of breath, chest pain, nausea or vomiting.  There have been no acute perioperative complications    Objective:   B/P: 111/47, Temp: 98.7, Pulse: 84, Resp: 16    Alert and Orientated x3; No Acute Distress; Appears comfortable     Hip Exam: dressing/wound is clean, dry, intact without significant drainage.  No erythema or signs of infection.     No evidence of DVT    Distal motor/sensory exam is intact in the superficial peroneal, deep peroneal and tibial nerve distributions.      Normal capillary refill with a palpable dorsalis pedis pulse.    Hemoglobin   Date Value Ref Range Status   12/02/2020 7.6 (L) 11.7 - 15.7 g/dL Final       Assessment/Plan:     1) POD # 1 S/P L Total Hip Arthroplasty  -Pain controlled  -PT/OT--- Strengthening and Gait training  -WBAT; No Hip ROM Precautions  -DVT prophylaxis with  mg po bid  -Dispo--To home POD#2 when cleared Medically and by PT.   -Follow-Up in [facility] in 2 weeks  -Hospitalist co-management  Follow Hgb currently asymptomatic with current counts

## 2020-12-02 NOTE — PROGRESS NOTES
[facility]    Hospitalist Progress Note      Assessment & Plan   Bethany Hoyt is a 80 year old female who was admitted on 12/1/2020.     Principal Problem:    Status post total replacement of left hip    Assessment: Postop day 1 and tolerated the procedure well without difficulties    Plan:   -Pain control, activity restrictions, ASA twice daily for dvt ppx per orthopedics  -PT/OT today    Acute blood loss anemia  Assessment: Hemoglobin dropped to 7.5, a possible component of dilution.  Plan:  -Transfuse to keep greater than 7  -start an iron supplement today     FEN: regular diet, discontinue IV fluidsPPX: SCD's     Code Status: Full Code    Neri Carolina    Interval History   Overnight events and afebrile, feeling well today, pain well controlled, no chest pain palpitations difficulty with breathing, eager to start moving around with therapy, eager to discharge home tomorrow.    -Data reviewed today: I reviewed all new labs and imaging results over the last 24 hours. I personally reviewed no images or EKG's today.    Physical Exam   Temp: 99.4  F (37.4  C) Temp src: Tympanic BP: (!) 131/38 Pulse: 74   Resp: 16 SpO2: 97 % O2 Device: None (Room air)    Vitals:    12/01/20 0827   Weight: 59 kg (130 lb)     Vital Signs with Ranges  Temp:  [97.7  F (36.5  C)-99.4  F (37.4  C)] 99.4  F (37.4  C)  Pulse:  [61-84] 74  Resp:  [16-18] 16  BP: ()/(38-68) 131/38  SpO2:  [90 %-99 %] 97 %  I/O last 3 completed shifts:  In: 2469 [P.O.:50; I.V.:2419]  Out: 2500 [Urine:2400; Blood:100]    Exam:  GENERAL: Talkative, in no apparent distress.  CARDIOVASCULAR: regular rate and rhythm, no murmurs, rubs, or gallops. Normal S1/S2. No lower extremity edema.   RESPIRATORY: clear to auscultation bilaterally, no wheezes, no crackles.    GI: soft, non-tender, non-distended, normoactive bowel sounds.  MUSCULOSKELETAL: Left hip with Aquacel dressing in place at the anterior aspect, no underlying spotting, CMS  intact to left foot.   Slight surrounding bruising of the Aquacel.   SKIN: no pallor, jaundice or rashes.  NEUROLOGY: AAOx3, follows commands, speech and language without focal deficits.        Medications       aspirin  325 mg Oral BID     ferrous sulfate  325 mg Oral BID w/meals     gabapentin  100 mg Oral At Bedtime     ibuprofen  400 mg Oral At Bedtime     multivitamin w/minerals  1 tablet Oral Daily     polyethylene glycol  17 g Oral Daily     senna-docusate  2 tablet Oral BID     sodium chloride (PF)  3 mL Intracatheter Q8H       Data   Recent Labs   Lab 12/02/20  0529   HGB 7.6*       No results found for this or any previous visit (from the past 24 hour(s)).

## 2020-12-02 NOTE — PLAN OF CARE
"BP 95/41   Pulse 73   Temp 98.6  F (37  C) (Tympanic)   Resp 16   Ht 1.651 m (5' 5\")   Wt 59 kg (130 lb)   LMP  (LMP Unknown)   SpO2 96%   Breastfeeding No   BMI 21.63 kg/m      Vitally stable throughout shift, afebrile, pain 4/10 - PRN norco helping with this.  Voiding well using bed pan.  Pt unable to sleep much at all this shift despite the use of PRN benadryl at 0200.  However pt was noted to be sleeping when last set of surgical VS were due at 0420.  These VS were not obtained due to pt finally being able to sleep.  Dressing is clean, dry, and intact.  Bruising around dressing increasing throughout the night.  CMS intact.  Pt has feeling from hip to toe with no numbness or tingling.  Pt is looking forward to getting up and moving around in the morning.  Jeanette Bernard RN............................ 12/2/2020 4:41 AM    /48   Pulse 65   Temp 98.6  F (37  C) (Tympanic)   Resp 16   Ht 1.651 m (5' 5\")   Wt 59 kg (130 lb)   LMP  (LMP Unknown)   SpO2 99%   Breastfeeding No   BMI 21.63 kg/m      Lab woke pt for blood - last set of surgical vitals were obtained.  Pt remained stable throughout shift. IV fluids stopped.  Jeanette Bernard RN............................ 12/2/2020 5:45 AM            "

## 2020-12-02 NOTE — PROGRESS NOTES
SAFETY CHECKLIST  ID Bands and Risk clasps correct and in place (DNR, Fall risk, Allergy, Latex, Limb):  Yes  All Lines Reconciled and labeled correctly: Yes  Whiteboard updated:Yes  Environmental interventions (bed/chair alarm on, call light, side rails, restraints, sitter....): Yes  Verify Tele #: NA

## 2020-12-02 NOTE — PROGRESS NOTES
:    Met with patient to discuss discharge plans. It is patient's goal to return home at discharge where she resides with her spouse. She reports that her daughter and grandchildren will be picking her up at discharge and staying with her for several days to help care for her. Discussed home care options. Patient declined home care. She prefers to return home without services. She would appreciate list of PT exercises that she can do on her own at home. Patient had a health care directive that needed to be notarized. Called notary down to patient's room. Health care directive was notarized. Copy placed in patient's chart. Original and additional copies given to patient. No further needs at this time.     RAFAEL Berger on 12/2/2020 at 10:13 AM

## 2020-12-02 NOTE — PROGRESS NOTES
12/02/20 0800   Quick Adds   Type of Visit Initial Occupational Therapy Evaluation   Living Environment   People in home spouse   Current Living Arrangements house   Home Accessibility stairs to enter home   Number of Stairs, Main Entrance 2   Stair Railings, Main Entrance railings safe and in good condition   Transportation Anticipated family or friend will provide   Living Environment Comments patient has  at home and her daughter and grand daughters will be staying with her for a week.     Self-Care   Usual Activity Tolerance good   Current Activity Tolerance moderate   Equipment Currently Used at Home walker, rolling;cane, straight   Disability/Function   Hearing Difficulty or Deaf yes   Patient's preferred means of communication verbal   Describe hearing loss bilateral hearing loss   Use of hearing assistive devices right hearing aid;left hearing aid   Were auxiliary aids offered? no   Wear Glasses or Blind yes   Concentrating, Remembering or Making Decisions Difficulty no   Difficulty Communicating no   Difficulty Eating/Swallowing no   Walking or Climbing Stairs Difficulty no   Dressing/Bathing Difficulty no   Toileting issues no   Fall history within last six months no   General Information   Onset of Illness/Injury or Date of Surgery 12/01/20   Referring Physician Dr. Lu   Patient/Family Therapy Goal Statement (OT) To go home with her  and daughter.    General Observations and Info Patient is 80-year-old female who has been admitted s/p L NARENDRA- anterior approach.  She is having lower H&H this morning and becomes nauseated upon functional mobility.    Cognitive Status Examination   Orientation Status orientation to person, place and time   Bed Mobility   Comment (Bed Mobility) Min A x 1 for sit to supine   Balance   Balance Comments CGA x 1 with FWW   Clinical Impression   Criteria for Skilled Therapeutic Interventions Met (OT) yes   OT Diagnosis Decreased independence with self-cares and  functional mobility s/p R NARENDRA   OT Problem List-Impairments impacting ADL balance;pain;post-surgical precautions   Assessment of Occupational Performance 1-3 Performance Deficits   Planned Therapy Interventions (OT) ADL retraining;bed mobility training   Clinical Decision Making Complexity (OT) low complexity   Therapy Frequency (OT) Daily   Predicted Duration of Therapy 1-3 days   Anticipated Equipment Needs Upon Discharge (OT) shower chair   Risks and Benefits of Treatment have been explained. Yes   Patient, Family & other staff in agreement with plan of care Yes   OT Discharge Planning    OT Discharge Recommendation (DC Rec) Home with assist   OT Rationale for DC Rec Patient is doing well s/p anterior L NARENDRA.  She will have assistance at home from her daughter and granddaughters for about 1 week upon return to home and her  will be able to assist as needed there after.  She may benefit from a shower chair.    OT Brief overview of current status  Patient is doing well upon OT eval.  She is able to transfer from bedside recliner with CGA x 1 with FWW and ambulate in room to bathroom with same assist.  Able to complete toileting and hand washing at sink with CGA x 1 and use of grab bar at toilet.  She does get slightly nauseated upon completion of minimal self-cares/functional mobility and returns to bed.  She required Min A x 1 for sit to supine.     Total Evaluation Time (Minutes)   Total Evaluation Time (Minutes) 15

## 2020-12-03 ENCOUNTER — APPOINTMENT (OUTPATIENT)
Dept: OCCUPATIONAL THERAPY | Facility: OTHER | Age: 80
DRG: 470 | End: 2020-12-03
Attending: ORTHOPAEDIC SURGERY
Payer: COMMERCIAL

## 2020-12-03 VITALS
WEIGHT: 130 LBS | BODY MASS INDEX: 21.66 KG/M2 | TEMPERATURE: 98.9 F | DIASTOLIC BLOOD PRESSURE: 40 MMHG | RESPIRATION RATE: 16 BRPM | HEIGHT: 65 IN | OXYGEN SATURATION: 99 % | HEART RATE: 83 BPM | SYSTOLIC BLOOD PRESSURE: 113 MMHG

## 2020-12-03 PROBLEM — D62 ANEMIA DUE TO BLOOD LOSS, ACUTE: Status: ACTIVE | Noted: 2020-12-03

## 2020-12-03 LAB — HGB BLD-MCNC: 7.1 G/DL (ref 11.7–15.7)

## 2020-12-03 PROCEDURE — 97535 SELF CARE MNGMENT TRAINING: CPT | Mod: GO | Performed by: OCCUPATIONAL THERAPIST

## 2020-12-03 PROCEDURE — 99238 HOSP IP/OBS DSCHRG MGMT 30/<: CPT | Performed by: INTERNAL MEDICINE

## 2020-12-03 PROCEDURE — 250N000013 HC RX MED GY IP 250 OP 250 PS 637: Performed by: ORTHOPAEDIC SURGERY

## 2020-12-03 PROCEDURE — 97116 GAIT TRAINING THERAPY: CPT | Mod: GP

## 2020-12-03 PROCEDURE — 250N000013 HC RX MED GY IP 250 OP 250 PS 637: Performed by: INTERNAL MEDICINE

## 2020-12-03 PROCEDURE — 97530 THERAPEUTIC ACTIVITIES: CPT | Mod: GP

## 2020-12-03 PROCEDURE — 36415 COLL VENOUS BLD VENIPUNCTURE: CPT | Performed by: ORTHOPAEDIC SURGERY

## 2020-12-03 PROCEDURE — 85018 HEMOGLOBIN: CPT | Performed by: ORTHOPAEDIC SURGERY

## 2020-12-03 RX ORDER — FERROUS SULFATE 325(65) MG
325 TABLET, DELAYED RELEASE (ENTERIC COATED) ORAL 2 TIMES DAILY WITH MEALS
Qty: 56 TABLET | Refills: 0 | Status: SHIPPED | OUTPATIENT
Start: 2020-12-03 | End: 2020-12-31

## 2020-12-03 RX ORDER — HYDROCODONE BITARTRATE AND ACETAMINOPHEN 5; 325 MG/1; MG/1
1-2 TABLET ORAL EVERY 4 HOURS PRN
Qty: 20 TABLET | Refills: 0 | Status: SHIPPED | OUTPATIENT
Start: 2020-12-03 | End: 2021-02-22

## 2020-12-03 RX ORDER — ASPIRIN 325 MG
325 TABLET, DELAYED RELEASE (ENTERIC COATED) ORAL 2 TIMES DAILY
Qty: 56 TABLET | Refills: 0 | Status: SHIPPED | OUTPATIENT
Start: 2020-12-03 | End: 2020-12-31

## 2020-12-03 RX ADMIN — HYDROCODONE BITARTRATE AND ACETAMINOPHEN 1 TABLET: 5; 325 TABLET ORAL at 03:17

## 2020-12-03 RX ADMIN — ASPIRIN 325 MG: 325 TABLET, COATED ORAL at 09:52

## 2020-12-03 RX ADMIN — HYDROCODONE BITARTRATE AND ACETAMINOPHEN 1 TABLET: 5; 325 TABLET ORAL at 08:24

## 2020-12-03 RX ADMIN — POLYETHYLENE GLYCOL 3350 17 G: 17 POWDER, FOR SOLUTION ORAL at 09:52

## 2020-12-03 RX ADMIN — DOCUSATE SODIUM 50 MG AND SENNOSIDES 8.6 MG 2 TABLET: 8.6; 5 TABLET, FILM COATED ORAL at 09:52

## 2020-12-03 RX ADMIN — FERROUS SULFATE TAB EC 325 MG (65 MG FE EQUIVALENT) 325 MG: 325 (65 FE) TABLET DELAYED RESPONSE at 07:35

## 2020-12-03 RX ADMIN — MULTIPLE VITAMINS W/ MINERALS TAB 1 TABLET: TAB at 09:52

## 2020-12-03 ASSESSMENT — ACTIVITIES OF DAILY LIVING (ADL)
ADLS_ACUITY_SCORE: 18

## 2020-12-03 NOTE — PROGRESS NOTES
12/03/20 1000   Signing Clinician's Name / Credentials   Signing clinician's name / credentials Karla Avila OTR/L   Quick Adds   Rehab Discipline OT   Grooming Training   [place] Level (Grooming Training) independent   Assistance (Grooming Training) set-up required   Bathing Training   [place] Level (Bathing Training) moderate assist (50% patient effort)   Assistance (Bathing Training) 1 person assist   Assistive Device (Bathing Training) tub seat with back;detachable shower head   Treatment Detail Ambulated into shower with CGA and FWW. sat on shower chair and washed all reachable areas. Assist for feet and back. Towel dry with min assist . Donned Bra and tank top. Transferred to EOB and completed UB and LB dressing.    Upper Body Dressing Training   [place] Level (Upper Body Dressing Training) contact guard   Assistance (Upper Body Dressing Training) set-up required   Treatment Detail Donned Pull over shirt, bra adn tank top   Lower Body Dressing Training   Lower Body Dressing Training Assistance minimum assist (75% patient effort)   Lower Body Dressing Training Assistance 1 person assist   Treatment Detail min assist to start pants over surgical foot.    OT Discharge Planning    OT Discharge Recommendation (DC Rec) Home with assist   OT Rationale for DC Rec Pt. is doing well. requires min assist to supervsion for self cares.    OT Brief overview of current status  Pt. requires min assist for problem solving with clothing management over surgical LE. She is able to perfomr funcitonal mobility with SBA and FWW. No LOB.

## 2020-12-03 NOTE — PLAN OF CARE
"/46   Pulse 88   Temp 98.7  F (37.1  C) (Tympanic)   Resp 16   Ht 1.651 m (5' 5\")   Wt 59 kg (130 lb)   LMP  (LMP Unknown)   SpO2 98%   Breastfeeding No   BMI 21.63 kg/m      VSS, afebrile, pain 4/10 - PRN and scheduled pain meds helping with this.  Had low grade temp at start of shift, but with Norco and ibuprofen temp normalized.  Has been able to sleep most of shift.  CMS intact, dressing clean, dry, & intact.  Pt up to bathroom with assist of 1, belt, and walker.  Ambulating well, urinating without issue.  Pt currently sleeping in bed with polar cooler in place.  Will continue to monitor.  Jeanette Bernard RN............................ 12/3/2020 4:23 AM        "

## 2020-12-03 NOTE — PLAN OF CARE
Discharge Planner OT   Patient plan for discharge: home with assist from daughter and grand daughter  Current status: Nichelle is performing functional mobility and transfers with SBA and FWW. She is able to shower while sitting on a shower chair with min assist for non reachable areas. She dressed with min assist for bra and max to fatou socks.   Barriers to return to prior living situation: weakness  Recommendations for discharge: home with assist for family  Rationale for recommendations: supervision and min assist required for safety       Entered by: Karla Avila 12/03/2020 10:16 AM

## 2020-12-03 NOTE — PROGRESS NOTES
Patient discharged home with family. Patient IV was removed and AVS was given and discussed with patient. Patient questions were discussed. Patient was escorted to daughter's car by primary RN and education was given to daughter Glenda.

## 2020-12-03 NOTE — DISCHARGE SUMMARY
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 Lu.    Hospital Course   Bethany Hoyt was admitted on 12/1/2020.  The following problems were addressed during her hospitalization:      Status post total replacement of left hip: She tolerated the procedure well without difficulties, pain was well controlled, she progressed well with physical and occupational therapy and she opted for ambulatory PT.  She was discharged home with family help, she will complete a 30-day course of twice daily aspirin for DVT prophylaxis per Ortho.  She was counseled regarding wound care and other activity restrictions and she will follow up with orthopedics as scheduled for incision check and staple removal.    Acute blood loss anemia: Hemoglobin dropped to 7.5, and by postop day 2 it decreased to 7.1, she did have some developing ecchymosis proximal to her incision site but was otherwise asymptomatic.  Discussed risks and benefits of 1 unit blood transfusion and she declined and preferred to take a iron supplement for the next 30 days and she will need a repeat hemoglobin at time of orthopedics follow-up.    Neri Carolina MD    Significant Results and Procedures      A surgical assistant during IMPLANTS:   1.  A 52 Continuum cup Lisbeth.   2.  Taperloc stem size 10 standard offset.   3.  A 0/36 ceramic ball.     Pending Results   These results will be followed up by NA  Unresulted Labs Ordered in the Past 30 Days of this Admission     No orders found from  11/1/2020 to 12/2/2020.          Code Status   Full Code       Primary Care Physician   Yasmin Jesus    Physical Exam   Temp: 98.9  F (37.2  C) Temp src: Tympanic BP: 113/40 Pulse: 83   Resp: 16 SpO2: 99 % O2 Device: None (Room air)    Vitals:    12/01/20 0827   Weight: 59 kg (130 lb)     Vital Signs with Ranges  Temp:  [98.7  F (37.1  C)-100.8  F (38.2  C)] 98.9  F (37.2  C)  Pulse:  [83-94] 83  Resp:  [16] 16  BP: (106-113)/(36-53) 113/40  SpO2:  [94 %-99 %] 99 %  I/O last 3 completed shifts:  In: 1200 [P.O.:1200]  Out: 1775 [Urine:1775]    Exam on day of discharge:  GENERAL: Talkative, in no apparent distress.  CARDIOVASCULAR: regular rate and rhythm, no murmurs, rubs, or gallops. Normal S1/S2. No lower extremity edema.   RESPIRATORY: clear to auscultation bilaterally, no wheezes, no crackles.    GI: soft, non-tender, non-distended, normoactive bowel sounds.  MUSCULOSKELETAL: Left hip with Aquacel dressing in place at the anterior aspect, no underlying spotting, CMS intact to left foot.   Slight surrounding bruising of the Aquacel.  Unchanged today.  NEUROLOGY: AAOx3, follows commands, speech and language without focal deficits.        Discharge Disposition   Discharged to home  Condition at discharge: Stable    Consultations This Hospital Stay   SOCIAL WORK IP CONSULT  OCCUPATIONAL THERAPY ADULT IP CONSULT  PHYSICAL THERAPY ADULT IP CONSULT  HOSPITALIST IP CONSULT    Time Spent on this Encounter   I, Neri Carolina MD, personally saw the patient today and spent less than or equal to 30 minutes discharging this patient.    Discharge Orders      Physical Therapy Referral      Reason for your hospital stay    Total hip replacement     Follow-up and recommended labs and tests     Patient has an implant (Click to go to implant navigator)   Sticky Notes to Physicians  Comment    Follow up with Dr. Lu on 12/16 @815 am     Discharge Instructions    -Use Tylenol and ibuprofen for mild pain  -Use the Norco as  needed for more severe pain  -The narcotic pain medication can cause significant constipation, use any over-the-counter stool softener if this occurs  -Take the iron pill twice daily until it is gone to help your red blood cell count recover  -Take aspirin 325 mg twice daily until it is gone to prevent blood clots from forming postoperatively     Wound care and dressings    Instructions to care for your wound at home: as directed.  You have an Aquacel dressing in place, this will remain in place until you follow-up with Dr. Koo.  It is safe to shower out covering or wrapping it, but do not soak in a tub.  She will be removed only if it becomes completely saturated with blood or fluid from beneath.  If it is removed it must be inspected in orthopedics clinic.     When to contact your care team    Call Dr. Coleman clinic if you have any of the following: temperature greater than 101,  increased shortness of breath, increased drainage, increased swelling or increased pain.     Activity    Your activity upon discharge: NO external rotation past neutral and NO hyperextension.     Diet    Follow this diet upon discharge: Orders Placed This Encounter      Advance Diet as Tolerated: Regular Diet Adult     Discharge Medications   Discharge Medication List as of 12/3/2020 10:04 AM      START taking these medications    Details   aspirin (ASA) 325 MG EC tablet Take 1 tablet (325 mg) by mouth 2 times daily for 28 days, Disp-56 tablet, R-0, E-Prescribe      ferrous sulfate (FE TABS) 325 (65 Fe) MG EC tablet Take 1 tablet (325 mg) by mouth 2 times daily (with meals) for 28 days, Disp-56 tablet, R-0, E-Prescribe      HYDROcodone-acetaminophen (NORCO) 5-325 MG tablet Take 1-2 tablets by mouth every 4 hours as needed for moderate to severe pain, Disp-20 tablet, R-0, E-Prescribe         CONTINUE these medications which have NOT CHANGED    Details   acetaminophen (TYLENOL) 500 MG tablet Take 500-1,000 mg by mouth every 6 hours as  needed for mild pain, Historical      ascorbic acid (VITAMIN C) 500 MG tablet Take 500 mg by mouth daily, Historical      calcium carbonate-vitamin D (OS-CARTER) 500-400 MG-UNIT tablet Take 1 tablet by mouth daily, Historical      ibuprofen (ADVIL/MOTRIN) 200 MG capsule Take 400 mg by mouth At Bedtime , Historical      Multiple Vitamins-Minerals (MULTI COMPLETE PO) Take 1 tablet by mouth daily, Historical      vitamin E (TOCOPHEROL) 400 units (180 mg) capsule Take 400 Units by mouth daily, Historical           Allergies   No Known Allergies  Data   Most Recent 3 CBC's:  Recent Labs   Lab Test 12/03/20  0520 12/02/20  0529 11/11/20  1539 10/09/19  1419 04/04/16  1610   WBC  --   --  4.3 3.2*  --    HGB 7.1* 7.6* 12.8 13.6 13.3   MCV  --   --  101* 101* 97   PLT  --   --  202 184 217      Most Recent 3 BMP's:  Recent Labs   Lab Test 11/11/20  1539 10/09/19  1419 04/04/16  1632    140 138   POTASSIUM 4.1 4.0 4.0   CHLORIDE 102 103 104   CO2 29 31 31   BUN 20 16 17   CR 0.75 0.73 0.83   ANIONGAP 8 6  --    CARTER 9.4 9.5 9.4   GLC 89 94 91     Most Recent 2 LFT's:  Recent Labs   Lab Test 10/09/19  1419 04/04/16  1632   AST 15  --    ALT 10  --    ALKPHOS 57 56   BILITOTAL 0.4 0.3     Most Recent INR's and Anticoagulation Dosing History:  Anticoagulation Dose History     There is no flowsheet data to display.        Most Recent 3 Troponin's:No lab results found.  Most Recent Cholesterol Panel:  Recent Labs   Lab Test 10/09/19  1419   CHOL 215*   *   HDL 85   TRIG 61     Most Recent 6 Bacteria Isolates From Any Culture (See EPIC Reports for Culture Details):No lab results found.  Most Recent TSH, T4 and A1c Labs:No lab results found.  Results for orders placed or performed during the hospital encounter of 12/01/20   XR Pelvis Port 1/2 Views    Narrative    PROCEDURE: XR PELVIS PORT 1/2 VW 12/1/2020 11:27 AM    HISTORY: POST AP pelvis in OR post THR    COMPARISONS: 9/3/2020.    TECHNIQUE: AP view of the  pelvis.    FINDINGS: There are bilateral hip arthroplasties. The one on the left  is new. There are skin staples in place.    No acute bony abnormality is seen.         Impression    IMPRESSION: Bilateral hip arthroplasties.    RENAN BENITO MD

## 2020-12-03 NOTE — PROGRESS NOTES
12/02/20 1500   Signing Clinician's Name / Credentials   Signing clinician's name / credentials Aide Minaya PTA   Quick Adds   PT Assistant Visit Number 1   Quick Adds Therapeutic Exercise   Range of Motion Hip   Functional Transfer Training   Symptoms Noted During/After Treatment fatigue;increased pain   Treatment Detail AM/PM Min/CGA for t/f's   Gait Training   Symptoms Noted During/After Treatment (Gait Training) fatigue   Distance in Feet (Required for LE Total Joints) AM 20', PM 60'   [place] Level (Gait Training) contact guard   Physical Assistance Level (Gait Training) 1 person assist   Weight Bearing (Gait Training) weight-bearing as tolerated   Assistive Device (Gait Training) rolling walker   Gait Analysis Deviations decreased belia;increased time in double stance   Therapeutic Exercise   Symptoms Noted During/After Treatment fatigue;increased pain   Treatment Detail Ex per NARENDRA protocol BID   Toilet Training   [place] Level (Toilet Training) contact guard   Assistance (Toilet Training) 1 person assist   Toilet Training Assistance - Assistive Device wall grab bar   Treatment Detail Completes toileting with VCs and CGA for proper use of walker and grab bar.     Additional Documentation   PT Plan continue PT

## 2020-12-03 NOTE — PROGRESS NOTES
SAFETY CHECKLIST  ID Bands and Risk clasps correct and in place (DNR, Fall risk, Allergy, Latex, Limb):  Yes  All Lines Reconciled and labeled correctly: Yes  Whiteboard updated:Yes  Environmental interventions (bed/chair alarm on, call light, side rails, restraints, sitter....): Yes  Verify Tele #: N/A

## 2020-12-03 NOTE — PROGRESS NOTES
12/03/20 1100   Signing Clinician's Name / Credentials   Signing clinician's name / credentials Aide Minaya PTA   Quick Adds   Rehab Discipline PT   PT Assistant Visit Number 2   Functional Transfer Training   Symptoms Noted During/After Treatment fatigue   Treatment Detail SBA for t/f    Gait Training   Symptoms Noted During/After Treatment (Gait Training) none   Distance in Feet (Required for LE Total Joints) 100'   [place] Level (Gait Training) stand-by assist   Physical Assistance Level (Gait Training) supervision;1 person assist   Weight Bearing (Gait Training) weight-bearing as tolerated   Assistive Device (Gait Training) rolling walker   Gait Analysis Deviations decreased belia;decreased swing-to-stance ratio;decreased weight-shifting ability   Therapeutic Activity   Symptoms Noted During/After Treatment Fatigue   Treatment Detail SBA for bed mob, toilet t/f   Additional Documentation   PT Plan discharge homewith family

## 2020-12-03 NOTE — PHARMACY - DISCHARGE MEDICATION RECONCILIATION AND EDUCATION
[facility]  Part of [facility]  [address] [place] [id number]    December 3, 2020    Dear Pharmacist,    Your customer, Bethany Hoyt, born on 1940, was recently discharged from [facility].  We have updated her medication list and want to alert you to the following:       Review of your medicines      START taking      Dose / Directions   aspirin 325 MG EC tablet  Commonly known as: ASA  Indication: VTE Prophylaxis      Dose: 325 mg  Take 1 tablet (325 mg) by mouth 2 times daily for 28 days  Quantity: 56 tablet  Refills: 0     ferrous sulfate 325 (65 Fe) MG EC tablet  Commonly known as: FE TABS      Dose: 325 mg  Take 1 tablet (325 mg) by mouth 2 times daily (with meals) for 28 days  Quantity: 56 tablet  Refills: 0     HYDROcodone-acetaminophen 5-325 MG tablet  Commonly known as: NORCO      Dose: 1-2 tablet  Take 1-2 tablets by mouth every 4 hours as needed for moderate to severe pain  Quantity: 20 tablet  Refills: 0        CONTINUE these medicines which have NOT CHANGED      Dose / Directions   acetaminophen 500 MG tablet  Commonly known as: TYLENOL      Dose: 500-1,000 mg  Take 500-1,000 mg by mouth every 6 hours as needed for mild pain  Refills: 0     calcium carbonate-vitamin D 500-400 MG-UNIT tablet  Commonly known as: OS-CARTER      Dose: 1 tablet  Take 1 tablet by mouth daily  Refills: 0     ibuprofen 200 MG capsule  Commonly known as: ADVIL/MOTRIN      Dose: 400 mg  Take 400 mg by mouth At Bedtime  Refills: 0     MULTI COMPLETE PO      Dose: 1 tablet  Take 1 tablet by mouth daily  Refills: 0     vitamin C 500 MG tablet  Commonly known as: ASCORBIC ACID      Dose: 500 mg  Take 500 mg by mouth daily  Refills: 0     vitamin E 400 units (180 mg) capsule  Commonly known as: TOCOPHEROL      Dose: 400 Units  Take 400 Units by mouth daily  Refills: 0           Where to get your medicines      These medications were sent to [id number] -  [place], [place] - [address]  [address] [address]    Phone: [phone]     aspirin 325 MG EC tablet    ferrous sulfate 325 (65 Fe) MG EC tablet    HYDROcodone-acetaminophen 5-325 MG tablet         We also reviewed Bethany SEEMA Hoyt's allergy list and updated it as needed:  Allergies: Patient has no known allergies.    Thank you for continuing to care for Bethany Hoyt.  We look forward to working together with you in the future.    Sincerely,  Glenis Ledesma, [facility]

## 2020-12-03 NOTE — PHARMACY - DISCHARGE MEDICATION RECONCILIATION AND EDUCATION
Pharmacy:  Discharge Counseling and Medication Reconciliation    [address]   [place] [address]  [phone] (home)   80 year old female  PCP: Yasmin Jesus    Allergies: Patient has no known allergies.    Discharge Counseling:    Pharmacist met with patient today to review the medication portion of the After Visit Summary (with an emphasis on NEW medications) and to address patient's questions/concerns.    Summary of Education: Reviewed aspirin ec, ferrous sulfate, and hydrocodone/acetaminophen    Materials Provided:  MedCounselor sheets printed from Clinical Pharmacology on: aspirin gastric resistant, iron, and hydrocodone/acetaminophen    Discharge Medication Reconciliation:    It has been determined that the patient has an adequate supply of medications available or which can be obtained from the patient's preferred pharmacy, which she has confirmed as: [facility]. An updated medication list will be faxed to the patient's pharmacy.    Thank you for the consult.    Glenis Ledesma RPH........December 3, 2020 9:07 AM    Per [facility], they will limit to 10 tablets per day due to morphine equivalent.  Glenis Ledesma RPH on 12/3/2020 at 9:47 AM

## 2020-12-07 ENCOUNTER — DOCUMENTATION ONLY (OUTPATIENT)
Dept: OTHER | Facility: CLINIC | Age: 80
End: 2020-12-07

## 2020-12-11 ENCOUNTER — TRANSFERRED RECORDS (OUTPATIENT)
Dept: HEALTH INFORMATION MANAGEMENT | Facility: OTHER | Age: 80
End: 2020-12-11

## 2020-12-14 ENCOUNTER — HOSPITAL ENCOUNTER (EMERGENCY)
Facility: OTHER | Age: 80
Discharge: HOME | End: 2020-12-14
Attending: PHYSICIAN ASSISTANT | Admitting: PHYSICIAN ASSISTANT
Payer: COMMERCIAL

## 2020-12-14 ENCOUNTER — APPOINTMENT (OUTPATIENT)
Dept: ULTRASOUND IMAGING | Facility: OTHER | Age: 80
End: 2020-12-14
Attending: PHYSICIAN ASSISTANT
Payer: COMMERCIAL

## 2020-12-14 VITALS
HEART RATE: 71 BPM | SYSTOLIC BLOOD PRESSURE: 111 MMHG | DIASTOLIC BLOOD PRESSURE: 72 MMHG | RESPIRATION RATE: 17 BRPM | WEIGHT: 130 LBS | TEMPERATURE: 99 F | BODY MASS INDEX: 21.63 KG/M2 | OXYGEN SATURATION: 98 %

## 2020-12-14 DIAGNOSIS — R60.9 DEPENDENT EDEMA: ICD-10-CM

## 2020-12-14 DIAGNOSIS — Z96.642 STATUS POST TOTAL REPLACEMENT OF LEFT HIP: ICD-10-CM

## 2020-12-14 LAB
ALBUMIN SERPL-MCNC: 3.6 G/DL (ref 3.5–5.7)
ALP SERPL-CCNC: 60 U/L (ref 34–104)
ALT SERPL W P-5'-P-CCNC: 13 U/L (ref 7–52)
ANION GAP SERPL CALCULATED.3IONS-SCNC: 6 MMOL/L (ref 3–14)
AST SERPL W P-5'-P-CCNC: 16 U/L (ref 13–39)
BASOPHILS # BLD AUTO: 0 10E9/L (ref 0–0.2)
BASOPHILS NFR BLD AUTO: 0.7 %
BILIRUB SERPL-MCNC: 0.5 MG/DL (ref 0.3–1)
BUN SERPL-MCNC: 16 MG/DL (ref 7–25)
CALCIUM SERPL-MCNC: 8.6 MG/DL (ref 8.6–10.3)
CHLORIDE SERPL-SCNC: 102 MMOL/L (ref 98–107)
CO2 SERPL-SCNC: 29 MMOL/L (ref 21–31)
CREAT SERPL-MCNC: 0.63 MG/DL (ref 0.6–1.2)
DIFFERENTIAL METHOD BLD: ABNORMAL
EOSINOPHIL # BLD AUTO: 0.1 10E9/L (ref 0–0.7)
EOSINOPHIL NFR BLD AUTO: 1.6 %
ERYTHROCYTE [DISTWIDTH] IN BLOOD BY AUTOMATED COUNT: 15.2 % (ref 10–15)
GFR SERPL CREATININE-BSD FRML MDRD: >90 ML/MIN/{1.73_M2}
GLUCOSE SERPL-MCNC: 109 MG/DL (ref 70–105)
HCT VFR BLD AUTO: 29 % (ref 35–47)
HGB BLD-MCNC: 8.9 G/DL (ref 11.7–15.7)
IMM GRANULOCYTES # BLD: 0 10E9/L (ref 0–0.4)
IMM GRANULOCYTES NFR BLD: 0.2 %
INR PPP: 0.98 (ref 0.86–1.14)
LYMPHOCYTES # BLD AUTO: 0.3 10E9/L (ref 0.8–5.3)
LYMPHOCYTES NFR BLD AUTO: 7.6 %
MCH RBC QN AUTO: 32.1 PG (ref 26.5–33)
MCHC RBC AUTO-ENTMCNC: 30.7 G/DL (ref 31.5–36.5)
MCV RBC AUTO: 105 FL (ref 78–100)
MONOCYTES # BLD AUTO: 0.3 10E9/L (ref 0–1.3)
MONOCYTES NFR BLD AUTO: 7.6 %
NEUTROPHILS # BLD AUTO: 3.6 10E9/L (ref 1.6–8.3)
NEUTROPHILS NFR BLD AUTO: 82.3 %
PLATELET # BLD AUTO: 473 10E9/L (ref 150–450)
POTASSIUM SERPL-SCNC: 3.9 MMOL/L (ref 3.5–5.1)
PROT SERPL-MCNC: 6.5 G/DL (ref 6.4–8.9)
RBC # BLD AUTO: 2.77 10E12/L (ref 3.8–5.2)
SODIUM SERPL-SCNC: 137 MMOL/L (ref 134–144)
WBC # BLD AUTO: 4.3 10E9/L (ref 4–11)

## 2020-12-14 PROCEDURE — 93971 EXTREMITY STUDY: CPT | Mod: LT

## 2020-12-14 PROCEDURE — 99284 EMERGENCY DEPT VISIT MOD MDM: CPT | Mod: 25 | Performed by: PHYSICIAN ASSISTANT

## 2020-12-14 PROCEDURE — 36415 COLL VENOUS BLD VENIPUNCTURE: CPT | Performed by: PHYSICIAN ASSISTANT

## 2020-12-14 PROCEDURE — 99283 EMERGENCY DEPT VISIT LOW MDM: CPT | Performed by: PHYSICIAN ASSISTANT

## 2020-12-14 PROCEDURE — 85610 PROTHROMBIN TIME: CPT | Performed by: PHYSICIAN ASSISTANT

## 2020-12-14 PROCEDURE — 85025 COMPLETE CBC W/AUTO DIFF WBC: CPT | Performed by: PHYSICIAN ASSISTANT

## 2020-12-14 PROCEDURE — 80053 COMPREHEN METABOLIC PANEL: CPT | Performed by: PHYSICIAN ASSISTANT

## 2020-12-14 ASSESSMENT — ENCOUNTER SYMPTOMS
ABDOMINAL PAIN: 0
LIGHT-HEADEDNESS: 0
ACTIVITY CHANGE: 0
TROUBLE SWALLOWING: 0
NECK PAIN: 0
DIZZINESS: 0
NECK STIFFNESS: 0
FACIAL ASYMMETRY: 0
SHORTNESS OF BREATH: 0
BACK PAIN: 0
SORE THROAT: 0
DYSURIA: 0
NAUSEA: 0
SPEECH DIFFICULTY: 0
FACIAL SWELLING: 0
WEAKNESS: 0
FREQUENCY: 0
CONSTIPATION: 0
COUGH: 0
FEVER: 0
TREMORS: 0
CHEST TIGHTNESS: 0
EYE PAIN: 0
VOMITING: 0
COLOR CHANGE: 0
DIARRHEA: 0
STRIDOR: 0
RHINORRHEA: 0
HEADACHES: 0
SEIZURES: 0
APPETITE CHANGE: 0
FATIGUE: 0
WHEEZING: 0

## 2020-12-14 NOTE — ED TRIAGE NOTES
Patient presents to the ED with complaints of increased post op swelling.  Patient states she had a total hip replacement on December 1st and was discharged on the 3rd.  Patient states she noticed increased swelling to the affected leg over the past week which patient states she feels has been progressively getting worse associated with increased pain. Patient states bruising around her hip has been getting better, however, states around her calf and ankle the bruising has increased.  Patient states she spoke with her surgeon about her concerns and was advised to come to the ED to be checked for a DVT.

## 2020-12-14 NOTE — ED AVS SNAPSHOT
[facility]  [address]  [place] [address]  Phone: [phone]  Fax: [phone]                                    Bethany Hoyt   MRN: [id number]    Department: [facility]   Date of Visit: 12/14/2020           After Visit Summary Signature Page    I have received my discharge instructions, and my questions have been answered. I have discussed any challenges I see with this plan with the nurse or doctor.    ..........................................................................................................................................  Patient/Patient Representative Signature      ..........................................................................................................................................  Patient Representative Print Name and Relationship to Patient    ..................................................               ................................................  Date                                   Time    ..........................................................................................................................................  Reviewed by Signature/Title    ...................................................              ..............................................  Date                                               Time          22EPIC Rev 08/18

## 2020-12-14 NOTE — ED PROVIDER NOTES
History     Chief Complaint   Patient presents with     Post-op Problem     HPI  Bethany Hoyt is a 80 year old female who underwent a left total hip arthroplasty with Dr. Eduardo on 12/1/2020.  She denies currently being on any blood thinners.  She is concerned because she has noticed some bruising and swelling to her distal leg below her knee area.  She reports even her toes are bruised.  She is reporting some calf pain as well.  Denies any other issues as she is scheduled to see Dr. Lu in 2 days on 12/16/2020 for wound check and to have her surgical bandaging removed.  Denies any fever or chills.  No nausea or vomiting.  No lightheadedness or dizziness.  Denies any pain in her hip and that she has been able to ambulate with no difficulties.  No chest pain or shortness of breath.    Allergies:  No Known Allergies    Problem List:    Patient Active Problem List    Diagnosis Date Noted     Anemia due to blood loss, acute 12/03/2020     Priority: Medium     Status post total replacement of left hip 12/01/2020     Priority: Medium     Primary osteoarthritis of left hip 09/04/2020     Priority: Medium     Chronic left-sided low back pain without sciatica 09/04/2020     Priority: Medium     Hyperlipidemia 01/26/2018     Priority: Medium     Overview:   Mild       Diverticulosis of sigmoid colon 06/19/2017     Priority: Medium     Prepatellar bursitis 03/25/2015     Priority: Medium     Subdeltoid bursitis 03/25/2015     Priority: Medium     H/O adenomatous polyp of colon 03/27/2012     Priority: Medium     Osteoarthrosis, unspecified whether generalized or localized, pelvic region and thigh 10/07/2011     Priority: Medium     Personal history of malignant neoplasm of breast 01/24/2011     Priority: Medium     Disorder of bone and cartilage 01/24/2011     Priority: Medium        Past Medical History:    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      Bilateral carpal  tunnel syndrome      Contact dermatitis      Disease of pericardium      Disorder of cartilage      Diverticulosis of intestine without perforation or abscess without bleeding      Diverticulosis of large intestine without perforation or abscess without bleeding      History of colonic polyps      Localized swelling, mass and lump, left upper limb      Malignant neoplasm of female breast (H)      Osteoarthritis of hip      Personal history of malignant neoplasm of breast        Past Surgical History:    Past Surgical History:   Procedure Laterality Date     ARTHROPLASTY HIP ANTERIOR Left 12/1/2020    Procedure: ARTHROPLASTY, HIP, TOTAL, DIRECT ANTERIOR APPROACH;  Surgeon: Amrik Lu MD;  Location: [facility] OR     AS TOTAL HIP ARTHROPLASTY Right 2015     BACK SURGERY      1959     COLONOSCOPY      5/2002,Diverticulosis     COLONOSCOPY      3/26/2012,Follow up 2017     COLONOSCOPY      06/19/2017,F/U N/A     MASTECTOMY SIMPLE      1990,Right     OTHER SURGICAL HISTORY      9/30/13,249512,OTHER,left index finger, [place]     RELEASE CARPAL TUNNEL      2005,Right     RELEASE CARPAL TUNNEL      2010,Left       Family History:    Family History   Problem Relation Age of Onset     Other - See Comments Father 85        Alzheimer's/Pneumonia     Cancer Mother         Cancer,Renal metastized to lung     Colon Cancer Brother         Cancer-colon     Anesthesia Reaction No family hx of         Anesthesia Problem     Blood Disease No family hx of         Blood Disease     Heart Disease No family hx of         Heart Disease     Diabetes No family hx of         Diabetes     Breast Cancer No family hx of         Cancer-breast       Social History:  Marital Status:   [2]  Social History     Tobacco Use     Smoking status: Never Smoker     Smokeless tobacco: Never Used   Substance Use Topics     Alcohol use: No     Drug use: No     Comment: Drug use: No        Medications:         acetaminophen (TYLENOL) 500 MG tablet        ascorbic acid (VITAMIN C) 500 MG tablet       aspirin (ASA) 325 MG EC tablet       calcium carbonate-vitamin D (OS-CARTER) 500-400 MG-UNIT tablet       ferrous sulfate (FE TABS) 325 (65 Fe) MG EC tablet       HYDROcodone-acetaminophen (NORCO) 5-325 MG tablet       ibuprofen (ADVIL/MOTRIN) 200 MG capsule       Multiple Vitamins-Minerals (MULTI COMPLETE PO)       vitamin E (TOCOPHEROL) 400 units (180 mg) capsule          Review of Systems   Constitutional: Negative for activity change, appetite change, fatigue and fever.   HENT: Negative for drooling, facial swelling, rhinorrhea, sore throat and trouble swallowing.    Eyes: Negative for pain and visual disturbance.   Respiratory: Negative for cough, chest tightness, shortness of breath, wheezing and stridor.    Cardiovascular: Negative for chest pain and leg swelling.   Gastrointestinal: Negative for abdominal pain, constipation, diarrhea, nausea and vomiting.   Genitourinary: Negative for dysuria, frequency and urgency.   Musculoskeletal: Negative for back pain, neck pain and neck stiffness.        Dependent bruising and swelling to her left lower extremity below the knee.   Skin: Negative for color change.   Neurological: Negative for dizziness, tremors, seizures, facial asymmetry, speech difficulty, weakness, light-headedness and headaches.       Physical Exam   BP: (!) 149/69  Pulse: 96  Temp: 99  F (37.2  C)  Resp: 17  Weight: 59 kg (130 lb)  SpO2: 93 %      Physical Exam  Constitutional:       General: She is not in acute distress.     Appearance: She is not ill-appearing, toxic-appearing or diaphoretic.   HENT:      Head: No raccoon eyes or Barfield's sign.      Jaw: No trismus.      Right Ear: No drainage or tenderness.      Left Ear: No drainage or tenderness.      Nose: Nose normal.   Eyes:      General: No scleral icterus.     Extraocular Movements: Extraocular movements intact.      Right eye: Normal extraocular motion and no nystagmus.      Left eye: Normal  extraocular motion and no nystagmus.      Pupils: Pupils are equal, round, and reactive to light.      Right eye: Pupil is reactive and not sluggish.      Left eye: Pupil is reactive and not sluggish.      Funduscopic exam:     Right eye: No AV nicking, arteriolar narrowing or papilledema. Red reflex present.         Left eye: No AV nicking, arteriolar narrowing or papilledema. Red reflex present.  Neck:      Musculoskeletal: Normal range of motion. Normal range of motion. No neck rigidity, pain with movement, spinous process tenderness or muscular tenderness.      Vascular: No JVD.      Trachea: No tracheal deviation.   Cardiovascular:      Rate and Rhythm: Normal rate and regular rhythm.   Pulmonary:      Effort: Pulmonary effort is normal. No respiratory distress.      Breath sounds: Normal breath sounds. No stridor. No wheezing.   Abdominal:      General: There is no distension.      Palpations: There is no mass.      Tenderness: There is no abdominal tenderness. There is no right CVA tenderness, left CVA tenderness, guarding or rebound.   Musculoskeletal: Normal range of motion.         General: Swelling present. No tenderness or deformity.      Left lower leg: Edema present.      Comments: Dependent bruising and swelling to her left lower extremity below the knee.. Her incisional bandage is intact with no surrounding erythema and no obvious signs of drainage.  No warmth to her thigh.  She has good distal pulses and neurologically she is intact.  Minimal swelling to the calf area with minimal tenderness.   Lymphadenopathy:      Cervical: No cervical adenopathy.      Right cervical: No superficial cervical adenopathy.     Left cervical: No superficial cervical adenopathy.   Skin:     General: Skin is warm and dry.      Capillary Refill: Capillary refill takes less than 2 seconds.   Neurological:      Mental Status: She is alert and oriented to person, place, and time.      GCS: GCS eye subscore is 4. GCS verbal  subscore is 5. GCS motor subscore is 6.      Motor: No tremor or seizure activity.      Coordination: Coordination normal.      Gait: Gait normal.   Psychiatric:         Mood and Affect: Mood normal.         Behavior: Behavior normal.         Thought Content: Thought content normal.         ED Course     Results for orders placed or performed during the hospital encounter of 12/14/20 (from the past 24 hour(s))   US Lower Extremity Venous Duplex Left    Narrative    Procedure: US LOWER EXTREMITY VENOUS DUPLEX LEFT    HISTORY:  calf pain s/p LTHA.    TECHNIQUE: Grayscale, color Doppler and compression views of the deep  venous structures of the left lower extremity.    COMPARISON: None.    FINDINGS:    Interrogation of the deep venous structures from the left common  femoral vein through the veins of the proximal calf demonstrate normal  grayscale appearance, compressibility and augmentable color Doppler  flow.    A seroma or hematoma is present anterior to the left common femoral  vein measuring 3.0 x 1.7 x 1.1 cm.      Impression    IMPRESSION:     No evidence of left lower extremity DVT.      VERÓNICA DOOLEY MD   CBC with platelets differential   Result Value Ref Range    WBC 4.3 4.0 - 11.0 10e9/L    RBC Count 2.77 (L) 3.8 - 5.2 10e12/L    Hemoglobin 8.9 (L) 11.7 - 15.7 g/dL    Hematocrit 29.0 (L) 35.0 - 47.0 %     (H) 78 - 100 fl    MCH 32.1 26.5 - 33.0 pg    MCHC 30.7 (L) 31.5 - 36.5 g/dL    RDW 15.2 (H) 10.0 - 15.0 %    Platelet Count 473 (H) 150 - 450 10e9/L    Diff Method Automated Method     % Neutrophils 82.3 %    % Lymphocytes 7.6 %    % Monocytes 7.6 %    % Eosinophils 1.6 %    % Basophils 0.7 %    % Immature Granulocytes 0.2 %    Absolute Neutrophil 3.6 1.6 - 8.3 10e9/L    Absolute Lymphocytes 0.3 (L) 0.8 - 5.3 10e9/L    Absolute Monocytes 0.3 0.0 - 1.3 10e9/L    Absolute Eosinophils 0.1 0.0 - 0.7 10e9/L    Absolute Basophils 0.0 0.0 - 0.2 10e9/L    Abs Immature Granulocytes 0.0 0 - 0.4 10e9/L    Comprehensive metabolic panel   Result Value Ref Range    Sodium 137 134 - 144 mmol/L    Potassium 3.9 3.5 - 5.1 mmol/L    Chloride 102 98 - 107 mmol/L    Carbon Dioxide 29 21 - 31 mmol/L    Anion Gap 6 3 - 14 mmol/L    Glucose 109 (H) 70 - 105 mg/dL    Urea Nitrogen 16 7 - 25 mg/dL    Creatinine 0.63 0.60 - 1.20 mg/dL    GFR Estimate >90 >60 mL/min/[1.73_m2]    GFR Estimate If Black >90 >60 mL/min/[1.73_m2]    Calcium 8.6 8.6 - 10.3 mg/dL    Bilirubin Total 0.5 0.3 - 1.0 mg/dL    Albumin 3.6 3.5 - 5.7 g/dL    Protein Total 6.5 6.4 - 8.9 g/dL    Alkaline Phosphatase 60 34 - 104 U/L    ALT 13 7 - 52 U/L    AST 16 13 - 39 U/L   INR   Result Value Ref Range    INR 0.98 0.86 - 1.14       Medications - No data to display    Assessments & Plan (with Medical Decision Making)     I have reviewed the nursing notes.    I have reviewed the findings, diagnosis, plan and need for follow up with the patient.      New Prescriptions    No medications on file       Final diagnoses:   Status post total replacement of left hip   Dependent edema     Afebrile.  Vital signs stable.  Patient status post left total hip arthroplasty on 12/1/2020 by .  She noted some increased swelling to her lower extremity below her knee as well as bruising and is here for further evaluation with concerns for possible DVT.  She is not on any blood thinners at this time.  On examination she does have a fair amount of dependent edema and ecchymosis but this is not uncommon at this point and reassurance was given.  Ultrasound of her lower extremity shows no acute findings other than a localized seroma/hematoma at the surgical site which is not uncommon as well.  Again reassurance was given.  CBC shows normal white blood cells and no left shift.  Her hemoglobin has actually improved at 8.9.  INR is 0.98.  Again reassurance was given to the patient she is very happy about this.  I was able to reach  as well to update him on this patient's  progress.  He is scheduled to see this patient in 2 days for wound check and reevaluation post op.  Follow-up sooner if there is any other concerns problems or questions      12/14/2020   [facility]     Pastor Montemayor PA-C  12/14/20 [id number]

## 2020-12-14 NOTE — ED NOTES
Rounded on patient.  She has no needs.  She is anxious to leave since her  is sitting out in parking lot with groceries in the car. She states lab was just in to draw her (delayed d/t US was at bedside).  Reassured patient that we will hopefully have all results back within 45 minutes.

## 2020-12-16 ENCOUNTER — OFFICE VISIT (OUTPATIENT)
Dept: ORTHOPEDICS | Facility: OTHER | Age: 80
End: 2020-12-16
Attending: ORTHOPAEDIC SURGERY
Payer: COMMERCIAL

## 2020-12-16 DIAGNOSIS — Z96.642 HISTORY OF LEFT HIP REPLACEMENT: Primary | ICD-10-CM

## 2020-12-16 PROCEDURE — 99024 POSTOP FOLLOW-UP VISIT: CPT | Performed by: ORTHOPAEDIC SURGERY

## 2020-12-16 PROCEDURE — G0463 HOSPITAL OUTPT CLINIC VISIT: HCPCS

## 2020-12-16 NOTE — PROGRESS NOTES
Patient is here for follow up on her left total hip.  Jemima Howe LPN .....................12/16/2020 8:06 AM

## 2020-12-17 NOTE — PROGRESS NOTES
Visit Date:   2020      REASON FOR EVALUATION:  Left hip replacement.      HISTORY OF PRESENT ILLNESS:  Bethany comes in with regards to left hip replacement.  Overall, reports doing well, no major concerns.  Here for suture removal and exam.  Does have some swelling here in the left hip at this point in time, but overall feels like things are trending in the right direction.      PHYSICAL EXAMINATION:  Hip shows incisional site to be healing well.  No signs or symptoms of infection present.  Neurovascular examination intact.  She is walking with less of a limp at this point in time.      IMPRESSION:  Left total hip replacement.      PLAN:  Continue to work through the rehabilitation process.  Ice, elevation and edema control.  We will plan to reassess with her in 5 weeks, x-rays, 2 views of the left hip at that point in time.         AZUCENA BOOGIE MD             D: 2020   T: 2020   MT: HILARY      Name:     BETHANY KING   MRN:      [id number]        Account:      [id number]   :      1940           Visit Date:   2020      Document: [id number]

## 2021-01-13 DIAGNOSIS — Z96.642 STATUS POST TOTAL REPLACEMENT OF LEFT HIP: Primary | ICD-10-CM

## 2021-01-20 ENCOUNTER — OFFICE VISIT (OUTPATIENT)
Dept: ORTHOPEDICS | Facility: OTHER | Age: 81
End: 2021-01-20
Attending: ORTHOPAEDIC SURGERY
Payer: MEDICARE

## 2021-01-20 ENCOUNTER — HOSPITAL ENCOUNTER (OUTPATIENT)
Dept: GENERAL RADIOLOGY | Facility: OTHER | Age: 81
End: 2021-01-20
Attending: ORTHOPAEDIC SURGERY
Payer: MEDICARE

## 2021-01-20 DIAGNOSIS — Z96.642 STATUS POST TOTAL REPLACEMENT OF LEFT HIP: ICD-10-CM

## 2021-01-20 DIAGNOSIS — Z96.642 STATUS POST TOTAL REPLACEMENT OF LEFT HIP: Primary | ICD-10-CM

## 2021-01-20 PROCEDURE — 99024 POSTOP FOLLOW-UP VISIT: CPT | Performed by: ORTHOPAEDIC SURGERY

## 2021-01-20 PROCEDURE — 73502 X-RAY EXAM HIP UNI 2-3 VIEWS: CPT

## 2021-01-20 PROCEDURE — G0463 HOSPITAL OUTPT CLINIC VISIT: HCPCS

## 2021-01-20 NOTE — PROGRESS NOTES
Visit Date:   2021      REASON FOR EVALUATION:  Left hip replacement.      HISTORY OF PRESENT ILLNESS:  Ms. Sims comes in with regards to left hip.  Overall, reports doing well, really no major concerns, happy with the outcome of surgery.  She is work through her rehabilitation course quite well at this point in time.  She did her home based therapy, of note.      PHYSICAL EXAMINATION:  Examination  of her hip shows excellent range of motion, minimal swelling.  Neurovascular examination is otherwise intact.  She is walking with no significant limp.      IMAGING:  X-rays were reviewed, 2 views, left hip.  The patient's components are in stable alignment and position at this current time.      IMPRESSION:  Left total hip replacement, doing well.      PLAN:  Continue to increase activities as tolerated.  Plan for 1-year checkup here for 2 views left hip at that point.  She will see me sooner if there are any concerns before that time.  Time on appointment 15.         AZUCENA BOOGIE MD             D: 2021   T: 2021   MT:       Name:     CORINNE KING   MRN:      [id number]        Account:      [id number]   :      1940           Visit Date:   2021      Document: [id number]

## 2021-01-20 NOTE — PROGRESS NOTES
Patient is here for follow up on her left total hip.  Jemima Howe LPN .....................1/20/2021 12:19 PM

## 2021-02-22 ENCOUNTER — OFFICE VISIT (OUTPATIENT)
Dept: FAMILY MEDICINE | Facility: OTHER | Age: 81
End: 2021-02-22
Attending: PHYSICIAN ASSISTANT
Payer: MEDICARE

## 2021-02-22 ENCOUNTER — HOSPITAL ENCOUNTER (OUTPATIENT)
Dept: GENERAL RADIOLOGY | Facility: OTHER | Age: 81
End: 2021-02-22
Attending: PHYSICIAN ASSISTANT
Payer: MEDICARE

## 2021-02-22 VITALS
BODY MASS INDEX: 21.87 KG/M2 | HEART RATE: 68 BPM | RESPIRATION RATE: 16 BRPM | TEMPERATURE: 98 F | DIASTOLIC BLOOD PRESSURE: 80 MMHG | WEIGHT: 131.4 LBS | SYSTOLIC BLOOD PRESSURE: 138 MMHG

## 2021-02-22 DIAGNOSIS — M79.642 PAIN OF LEFT HAND: ICD-10-CM

## 2021-02-22 DIAGNOSIS — M79.642 PAIN OF LEFT HAND: Primary | ICD-10-CM

## 2021-02-22 DIAGNOSIS — R25.2 LEG CRAMPS: ICD-10-CM

## 2021-02-22 LAB
ANION GAP SERPL CALCULATED.3IONS-SCNC: 6 MMOL/L (ref 3–14)
BASOPHILS # BLD AUTO: 0 10E9/L (ref 0–0.2)
BASOPHILS NFR BLD AUTO: 0.7 %
BUN SERPL-MCNC: 21 MG/DL (ref 7–25)
CALCIUM SERPL-MCNC: 9.1 MG/DL (ref 8.6–10.3)
CHLORIDE SERPL-SCNC: 103 MMOL/L (ref 98–107)
CO2 SERPL-SCNC: 28 MMOL/L (ref 21–31)
CREAT SERPL-MCNC: 0.66 MG/DL (ref 0.6–1.2)
DIFFERENTIAL METHOD BLD: ABNORMAL
EOSINOPHIL # BLD AUTO: 0.1 10E9/L (ref 0–0.7)
EOSINOPHIL NFR BLD AUTO: 2.1 %
ERYTHROCYTE [DISTWIDTH] IN BLOOD BY AUTOMATED COUNT: 14.3 % (ref 10–15)
GFR SERPL CREATININE-BSD FRML MDRD: 86 ML/MIN/{1.73_M2}
GLUCOSE SERPL-MCNC: 106 MG/DL (ref 70–105)
HCT VFR BLD AUTO: 39.9 % (ref 35–47)
HGB BLD-MCNC: 12.6 G/DL (ref 11.7–15.7)
IMM GRANULOCYTES # BLD: 0 10E9/L (ref 0–0.4)
IMM GRANULOCYTES NFR BLD: 0.4 %
LYMPHOCYTES # BLD AUTO: 0.8 10E9/L (ref 0.8–5.3)
LYMPHOCYTES NFR BLD AUTO: 27 %
MAGNESIUM SERPL-MCNC: 2 MG/DL (ref 1.9–2.7)
MCH RBC QN AUTO: 29.7 PG (ref 26.5–33)
MCHC RBC AUTO-ENTMCNC: 31.6 G/DL (ref 31.5–36.5)
MCV RBC AUTO: 94 FL (ref 78–100)
MONOCYTES # BLD AUTO: 0.3 10E9/L (ref 0–1.3)
MONOCYTES NFR BLD AUTO: 9.2 %
NEUTROPHILS # BLD AUTO: 1.7 10E9/L (ref 1.6–8.3)
NEUTROPHILS NFR BLD AUTO: 60.6 %
PLATELET # BLD AUTO: 211 10E9/L (ref 150–450)
POTASSIUM SERPL-SCNC: 4.2 MMOL/L (ref 3.5–5.1)
RBC # BLD AUTO: 4.24 10E12/L (ref 3.8–5.2)
SODIUM SERPL-SCNC: 137 MMOL/L (ref 134–144)
URATE SERPL-MCNC: 4.3 MG/DL (ref 4.4–7.6)
WBC # BLD AUTO: 2.8 10E9/L (ref 4–11)

## 2021-02-22 PROCEDURE — G0463 HOSPITAL OUTPT CLINIC VISIT: HCPCS

## 2021-02-22 PROCEDURE — 85025 COMPLETE CBC W/AUTO DIFF WBC: CPT | Mod: ZL | Performed by: PHYSICIAN ASSISTANT

## 2021-02-22 PROCEDURE — 99213 OFFICE O/P EST LOW 20 MIN: CPT | Performed by: PHYSICIAN ASSISTANT

## 2021-02-22 PROCEDURE — G0463 HOSPITAL OUTPT CLINIC VISIT: HCPCS | Mod: 25

## 2021-02-22 PROCEDURE — 80048 BASIC METABOLIC PNL TOTAL CA: CPT | Mod: ZL | Performed by: PHYSICIAN ASSISTANT

## 2021-02-22 PROCEDURE — 84550 ASSAY OF BLOOD/URIC ACID: CPT | Mod: ZL | Performed by: PHYSICIAN ASSISTANT

## 2021-02-22 PROCEDURE — 83735 ASSAY OF MAGNESIUM: CPT | Mod: ZL | Performed by: PHYSICIAN ASSISTANT

## 2021-02-22 PROCEDURE — 73130 X-RAY EXAM OF HAND: CPT | Mod: LT

## 2021-02-22 PROCEDURE — 36415 COLL VENOUS BLD VENIPUNCTURE: CPT | Mod: ZL | Performed by: PHYSICIAN ASSISTANT

## 2021-02-22 RX ORDER — PREDNISONE 20 MG/1
40 TABLET ORAL EVERY MORNING
Qty: 10 TABLET | Refills: 0 | Status: SHIPPED | OUTPATIENT
Start: 2021-02-22 | End: 2021-02-27

## 2021-02-22 ASSESSMENT — PAIN SCALES - GENERAL: PAINLEVEL: EXTREME PAIN (8)

## 2021-02-22 NOTE — LETTER
February 22, 2021      Bethany Hoyt     [facility] [id number]        Dear ,    We are writing to inform you of your test results.    Your hemoglobin, leukocytes, kidney function, magnesium, and uric acid are stable.    Resulted Orders   Uric acid   Result Value Ref Range    Uric Acid 4.3 (L) 4.4 - 7.6 mg/dL   Magnesium   Result Value Ref Range    Magnesium 2.0 1.9 - 2.7 mg/dL   Basic Metabolic Panel   Result Value Ref Range    Sodium 137 134 - 144 mmol/L    Potassium 4.2 3.5 - 5.1 mmol/L    Chloride 103 98 - 107 mmol/L    Carbon Dioxide 28 21 - 31 mmol/L    Anion Gap 6 3 - 14 mmol/L    Glucose 106 (H) 70 - 105 mg/dL    Urea Nitrogen 21 7 - 25 mg/dL    Creatinine 0.66 0.60 - 1.20 mg/dL    GFR Estimate 86 >60 mL/min/[1.73_m2]    GFR Estimate If Black >90 >60 mL/min/[1.73_m2]    Calcium 9.1 8.6 - 10.3 mg/dL   CBC and Differential   Result Value Ref Range    WBC 2.8 (L) 4.0 - 11.0 10e9/L    RBC Count 4.24 3.8 - 5.2 10e12/L    Hemoglobin 12.6 11.7 - 15.7 g/dL    Hematocrit 39.9 35.0 - 47.0 %    MCV 94 78 - 100 fl    MCH 29.7 26.5 - 33.0 pg    MCHC 31.6 31.5 - 36.5 g/dL    RDW 14.3 10.0 - 15.0 %    Platelet Count 211 150 - 450 10e9/L    Diff Method Automated Method     % Neutrophils 60.6 %    % Lymphocytes 27.0 %    % Monocytes 9.2 %    % Eosinophils 2.1 %    % Basophils 0.7 %    % Immature Granulocytes 0.4 %    Absolute Neutrophil 1.7 1.6 - 8.3 10e9/L    Absolute Lymphocytes 0.8 0.8 - 5.3 10e9/L    Absolute Monocytes 0.3 0.0 - 1.3 10e9/L    Absolute Eosinophils 0.1 0.0 - 0.7 10e9/L    Absolute Basophils 0.0 0.0 - 0.2 10e9/L    Abs Immature Granulocytes 0.0 0 - 0.4 10e9/L       If you have any questions or concerns, please call the clinic at the number listed above.       Sincerely,      Erica Argueta PA-C

## 2021-02-22 NOTE — PROGRESS NOTES
Nursing Notes:   Pauly Piña LPN  2/22/2021 10:38 AM  Signed  Chief Complaint   Patient presents with     Hand Pain     Left hand swelling     Leg Pain     leg cramps         Medication Reconciliation: complete    Pauly Piña LPN        HPI:    Bethany Hoyt is a 80 year old female who presents for left hand swelling.  Patient states that her symptoms flared a few weeks ago.  She is swollen and tender on the left dorsal hand.  It is worse over the second through fourth metacarpals.  History of arthritis in the past.  Is mildly red and swollen.  Feels warm.  She is also been having charley horses in her legs at night.  Having leg cramps.  Worse in the left leg.  History of having bilateral hip surgery.  Right hip surgery was approximately 6 years ago.  Left hip was recently told on 12/1.  She had a lot of bruising in her left leg after surgery approximately 1 month ago.  Still having some cramping in the left leg/foot however it is improving.  Uses Tylenol PM as needed.  Her leg aches at times.  No recent head trauma.  Left third MCP joint or sores.  She has a difficult time completing extreme flexion and extension of the left third finger.  No history of gout in the past.  Family history of gout.    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     No Comments Provided     Bilateral carpal tunnel syndrome     No Comments Provided     Contact dermatitis     1/24/2011     Disease of pericardium     No Comments Provided     Disorder of cartilage     1/24/2011     Diverticulosis of intestine without perforation or abscess without bleeding     No Comments Provided     Diverticulosis of large intestine without perforation or abscess without bleeding     3/26/2012     History of colonic polyps     3/27/2012     Localized swelling, mass and lump, left upper limb     9/27/2013     Malignant neoplasm of female breast (H)     1990,estrogen receptor  borderline, progesterone receptor positive, treated with Tamoxifen 10 mg. b.i.d. times five years.  Status post chemotherapy times one and Tamoxifen times five years.     Osteoarthritis of hip     10/7/2011     Personal history of malignant neoplasm of breast     1/24/2011       Past Surgical History:   Procedure Laterality Date     ARTHROPLASTY HIP ANTERIOR Left 12/1/2020    Procedure: ARTHROPLASTY, HIP, TOTAL, DIRECT ANTERIOR APPROACH;  Surgeon: Amrik Lu MD;  Location: [facility] OR     AS TOTAL HIP ARTHROPLASTY Right 2015     BACK SURGERY      1959     COLONOSCOPY      5/2002,Diverticulosis     COLONOSCOPY      3/26/2012,Follow up 2017     COLONOSCOPY      06/19/2017,F/U N/A     MASTECTOMY SIMPLE      1990,Right     OTHER SURGICAL HISTORY      9/30/13,[id number],OTHER,left index finger, [place]     RELEASE CARPAL TUNNEL      2005,Right     RELEASE CARPAL TUNNEL      2010,Left       Family History   Problem Relation Age of Onset     Other - See Comments Father 85        Alzheimer's/Pneumonia     Cancer Mother         Cancer,Renal metastized to lung     Colon Cancer Brother         Cancer-colon     Anesthesia Reaction No family hx of         Anesthesia Problem     Blood Disease No family hx of         Blood Disease     Heart Disease No family hx of         Heart Disease     Diabetes No family hx of         Diabetes     Breast Cancer No family hx of         Cancer-breast       Social History     Tobacco Use     Smoking status: Never Smoker     Smokeless tobacco: Never Used   Substance Use Topics     Alcohol use: No       Current Outpatient Medications   Medication Sig Dispense Refill     acetaminophen (TYLENOL) 500 MG tablet Take 500-1,000 mg by mouth every 6 hours as needed for mild pain       ascorbic acid (VITAMIN C) 500 MG tablet Take 500 mg by mouth daily       calcium carbonate-vitamin D (OS-CARTER) 500-400 MG-UNIT tablet Take 1 tablet by mouth daily       ibuprofen (ADVIL/MOTRIN) 200 MG capsule Take 400 mg by mouth  At Bedtime        Multiple Vitamins-Minerals (MULTI COMPLETE PO) Take 1 tablet by mouth daily       predniSONE (DELTASONE) 20 MG tablet Take 2 tablets (40 mg) by mouth every morning for 5 days 10 tablet 0     vitamin E (TOCOPHEROL) 400 units (180 mg) capsule Take 400 Units by mouth daily         No Known Allergies    REVIEW OF SYSTEMS:  Refer to HPI.    EXAM:   Vitals:    /80 (BP Location: Right arm, Patient Position: Sitting, Cuff Size: Adult Regular)   Pulse 68   Temp 98  F (36.7  C)   Resp 16   Wt 59.6 kg (131 lb 6.4 oz)   LMP  (LMP Unknown)   BMI 21.87 kg/m      General Appearance: Pleasant, alert, appropriate appearance for age. No acute distress  Musculoskeletal Exam: Left second, third, and fourth MCP joints are tender to palpation, greatest on the third.  Mild swelling, erythema, and warmth appreciated on left dorsal hand.  Pain with extreme flexion and extension of the left third finger.  No pain with palpation of the lower extremities.  No calf pain to palpation.  Negative Homans' sign.  Foot Exam: Left and right foot: Normal capillary refill, no lesions, nail hygiene good.  Skin: no rash or abnormalities  Neurologic Exam: Nonfocal, normal gross motor, tone coordination and no tremor.  Psychiatric Exam: Alert and oriented - appropriate affect.    PHQ Depression Screen  PHQ-9 SCORE 10/10/2019   PHQ-9 Total Score 0     Labs:   Results for orders placed or performed during the hospital encounter of 02/22/21   XR Hand Left G/E 3 Views     Status: None    Narrative    PROCEDURE:  XR HAND LT G/E 3 VW    HISTORY: Pain of left hand.    COMPARISON:  5/5/2014.    TECHNIQUE:  3 views left hand.    FINDINGS:  Mild focal irregularity is seen on the lateral view  projecting posterior to the metacarpal heads. Absent any suspicious  findings on the other images, a fracture is considered unlikely.  However, distal hand swelling is seen. Consider follow-up.    Advanced degenerative changes are present at the  first CMC and STT  joints, as well as the radiocarpal joint. No foreign body is seen.     VERÓNICA DOOLEY MD   Results for orders placed or performed in visit on 02/22/21   Uric acid     Status: Abnormal   Result Value Ref Range    Uric Acid 4.3 (L) 4.4 - 7.6 mg/dL   Magnesium     Status: None   Result Value Ref Range    Magnesium 2.0 1.9 - 2.7 mg/dL   Basic Metabolic Panel     Status: Abnormal   Result Value Ref Range    Sodium 137 134 - 144 mmol/L    Potassium 4.2 3.5 - 5.1 mmol/L    Chloride 103 98 - 107 mmol/L    Carbon Dioxide 28 21 - 31 mmol/L    Anion Gap 6 3 - 14 mmol/L    Glucose 106 (H) 70 - 105 mg/dL    Urea Nitrogen 21 7 - 25 mg/dL    Creatinine 0.66 0.60 - 1.20 mg/dL    GFR Estimate 86 >60 mL/min/[1.73_m2]    GFR Estimate If Black >90 >60 mL/min/[1.73_m2]    Calcium 9.1 8.6 - 10.3 mg/dL   CBC and Differential     Status: Abnormal   Result Value Ref Range    WBC 2.8 (L) 4.0 - 11.0 10e9/L    RBC Count 4.24 3.8 - 5.2 10e12/L    Hemoglobin 12.6 11.7 - 15.7 g/dL    Hematocrit 39.9 35.0 - 47.0 %    MCV 94 78 - 100 fl    MCH 29.7 26.5 - 33.0 pg    MCHC 31.6 31.5 - 36.5 g/dL    RDW 14.3 10.0 - 15.0 %    Platelet Count 211 150 - 450 10e9/L    Diff Method Automated Method     % Neutrophils 60.6 %    % Lymphocytes 27.0 %    % Monocytes 9.2 %    % Eosinophils 2.1 %    % Basophils 0.7 %    % Immature Granulocytes 0.4 %    Absolute Neutrophil 1.7 1.6 - 8.3 10e9/L    Absolute Lymphocytes 0.8 0.8 - 5.3 10e9/L    Absolute Monocytes 0.3 0.0 - 1.3 10e9/L    Absolute Eosinophils 0.1 0.0 - 0.7 10e9/L    Absolute Basophils 0.0 0.0 - 0.2 10e9/L    Abs Immature Granulocytes 0.0 0 - 0.4 10e9/L         ASSESSMENT AND PLAN:      ICD-10-CM    1. Pain of left hand  M79.642 XR Hand Left G/E 3 Views     CBC and Differential     Basic Metabolic Panel     Uric acid     Uric acid     Basic Metabolic Panel     CBC and Differential     predniSONE (DELTASONE) 20 MG tablet     Orthopedic & Spine  Referral   2. Leg cramps  R25.2  CBC and Differential     Basic Metabolic Panel     Magnesium     Magnesium     Basic Metabolic Panel     CBC and Differential         Completed left hand xray.  I personally reviewed the xray. I found no fracture appreciated upon initial read of xray.  Final read pending by radiology.    Complete labs for monitoring including CBC, BMP, magnesium, and uric acid.  Labs are stable.    Started on prednisone to calm down the inflammation for possible gout inflammation.  No bacterial infection concerns are appreciated at this time.    Encouraged to take ibuprofen for relief up to 4 times per day.  Encouraged to elevate your hand and feet up to the level of your heart 10 to 15 minutes at a time 4 times daily..  Encouraged to use ice 15 minutes at a time several times per day to decrease pain. Return to clinic in 1-2 weeks as necessary for persistent pain. Return to clinic with any change or worsening of symptoms.     Increase fluids with electrolytes.     Referred to ortho for a consult.       Patient Instructions   Started on prednisone to calm down the inflammation.    Encouraged to take ibuprofen for relief up to 4 times per day.  Encouraged to elevate your hand and feet up to the level of your heart 10 to 15 minutes at a time 4 times daily..  Encouraged to use ice 15 minutes at a time several times per day to decrease pain. Return to clinic in 1-2 weeks as necessary for persistent pain. Return to clinic with any change or worsening of symptoms.     Increase fluids with electrolytes.          Erica Argueta PA-C PA-C..................2/22/2021 10:38 AM

## 2021-02-22 NOTE — NURSING NOTE
Chief Complaint   Patient presents with     Hand Pain     Left hand swelling     Leg Pain     leg cramps         Medication Reconciliation: complete    Pauly Piña, LPN

## 2021-02-22 NOTE — PATIENT INSTRUCTIONS
Started on prednisone to calm down the inflammation.    Encouraged to take ibuprofen for relief up to 4 times per day.  Encouraged to elevate your hand and feet up to the level of your heart 10 to 15 minutes at a time 4 times daily..  Encouraged to use ice 15 minutes at a time several times per day to decrease pain. Return to clinic in 1-2 weeks as necessary for persistent pain. Return to clinic with any change or worsening of symptoms.     Increase fluids with electrolytes.

## 2021-03-08 ENCOUNTER — TELEPHONE (OUTPATIENT)
Dept: FAMILY MEDICINE | Facility: OTHER | Age: 81
End: 2021-03-08

## 2021-03-08 DIAGNOSIS — Z79.2 PROPHYLACTIC ANTIBIOTIC: Primary | ICD-10-CM

## 2021-03-08 DIAGNOSIS — Z79.2 PROPHYLACTIC ANTIBIOTIC: ICD-10-CM

## 2021-03-08 RX ORDER — AMOXICILLIN 500 MG/1
500 CAPSULE ORAL DAILY
Qty: 4 CAPSULE | Refills: 0 | Status: SHIPPED | OUTPATIENT
Start: 2021-03-08 | End: 2021-03-10

## 2021-03-08 RX ORDER — AMOXICILLIN 500 MG/1
500 CAPSULE ORAL DAILY
Qty: 2 CAPSULE | Refills: 0 | Status: SHIPPED | OUTPATIENT
Start: 2021-03-08 | End: 2021-03-08

## 2021-03-08 NOTE — TELEPHONE ENCOUNTER
Called and verified patient full name and . Notified patient of RX.     Rosa Terry LPN on 3/8/2021 at 2:38 PM

## 2021-03-08 NOTE — TELEPHONE ENCOUNTER
States she would like a prescription for an antibiotic sent to [facility] pharmacy due to having an appt with a dentist of Thursday

## 2021-03-08 NOTE — TELEPHONE ENCOUNTER
States they received a prescription for amoxicillin and the prescription is normally 2,000 mg for dental appts. Would like to discuss

## 2021-03-08 NOTE — TELEPHONE ENCOUNTER
Called and verified patient full name and . Patient states she is having dental work done on Thursday and an ABX is needed because of her hip surgery.     She did not know which ABX.     [facility] pharmacy.     Rosa Terry LPN on 3/8/2021 at 9:21 AM

## 2021-03-09 DIAGNOSIS — Z79.2 PROPHYLACTIC ANTIBIOTIC: ICD-10-CM

## 2021-03-09 NOTE — TELEPHONE ENCOUNTER
"Per pharmacy fax-\"Pre-Med for dental appt? Should be 4 capsules po 1 hour prior to appt.? Please clarify. Thanks\".   "

## 2021-03-10 RX ORDER — AMOXICILLIN 500 MG/1
500 CAPSULE ORAL DAILY
Qty: 4 CAPSULE | Refills: 0 | OUTPATIENT
Start: 2021-03-10

## 2021-03-10 RX ORDER — AMOXICILLIN 500 MG/1
2000 CAPSULE ORAL DAILY
Qty: 4 CAPSULE | Refills: 0 | Status: SHIPPED | OUTPATIENT
Start: 2021-03-10 | End: 2023-11-30

## 2021-03-10 NOTE — TELEPHONE ENCOUNTER
Disp Refills Start End KIMBERLY   amoxicillin (AMOXIL) 500 MG capsule 4 capsule 0 3/8/2021  No   Sig - Route: Take 1 capsule (500 mg) by mouth daily - Oral     Prescription just filled on 3/8/2021 by PCP.  Pharmacy looking for clarification on this script. It appears it should have been 4 capsules by mouth daily per previous telephone encounter on 3/8/2021. Sonam Aceves RN  ....................  3/10/2021   10:41 AM

## 2021-04-30 ENCOUNTER — OFFICE VISIT (OUTPATIENT)
Dept: ORTHOPEDICS | Facility: OTHER | Age: 81
End: 2021-04-30
Attending: SPECIALIST
Payer: COMMERCIAL

## 2021-04-30 DIAGNOSIS — M79.642 LEFT HAND PAIN: Primary | ICD-10-CM

## 2021-04-30 PROCEDURE — G0463 HOSPITAL OUTPT CLINIC VISIT: HCPCS

## 2021-04-30 PROCEDURE — 99213 OFFICE O/P EST LOW 20 MIN: CPT | Performed by: SPECIALIST

## 2021-04-30 NOTE — PROGRESS NOTES
Patient is here for consult on her left hand pain.  Jemima Howe LPN .....................4/30/2021 11:32 AM

## 2021-04-30 NOTE — PROGRESS NOTES
Visit Date: 2021    HISTORY OF PRESENT ILLNESS:  Bethany is an 80-year-old female I am seeing today for evaluation of her left hand.  She was told she had gout and was placed on some p.o. pain medications, which seemed to improve her symptoms.  Her  has been doctoring lately and has a mass of some kind.    The patient's past medical history, past surgical history, medications, and allergies were reviewed.     PHYSICAL EXAMINATION:  Shows an 80-year-old female.  She looks younger than her chronologic age.  Examination of both upper extremities reveals full and symmetric range of motion of elbows, wrists.  Examination of the left hand shows full range of motion.  No evidence of significant swelling.  No focal tenderness.    X-rays were reviewed of the left hand on 2021.  These reveal severe basal joint arthrosis, as well as severe degenerative changes of the FCT joint.    IMPRESSION:  Probable history of gouty changes, currently asymptomatic.  We would advance her activities and see her back if symptoms warrant on an as-needed basis.    Ricardo Jiang MD        D: 2021   T: 2021   MT: [facility]    Name:     BETHANY KING  MRN:      [id number]        Account:    [id number]   :      1940           Visit Date: 2021     Document: [id number]

## 2022-07-21 NOTE — PROGRESS NOTES
Patient swabbed for COVID-19 testing.  Norma J. Gosselin, LPN on 11/27/2020 at 10:00 AM      Surgery     [FreeTextEntry1] : FENO was 6; normal value being less than 25\par Fractional exhaled nitric oxide (FENO) is regarded as a simple, noninvasive method for assessing eosinophilic airway inflammation. Produced by a variety of cells within the lung, nitric oxide (NO) concentrations are generally low in healthy individuals. However, high concentrations of NO appear to be involved in nonspecific host defense mechanisms and chronic inflammatory diseases such as asthma. The American Thoracic Society (ATS) therefore has recommended using FENO to aid in the diagnosis and monitoring of eosinophilic airway inflammation and asthma, and for identifying steroid responsive individuals whose chronic respiratory symptoms may be airway inflammation. \par \par PFT reveals mild to moderate restrictive and mild obstructive dysfunction with an FEV1 of 1.98L, which is 54% of predicted, with normal flow volume loop

## 2022-10-05 ENCOUNTER — OFFICE VISIT (OUTPATIENT)
Dept: FAMILY MEDICINE | Facility: OTHER | Age: 82
End: 2022-10-05
Attending: STUDENT IN AN ORGANIZED HEALTH CARE EDUCATION/TRAINING PROGRAM
Payer: COMMERCIAL

## 2022-10-05 VITALS
BODY MASS INDEX: 22.15 KG/M2 | HEART RATE: 88 BPM | TEMPERATURE: 98.6 F | SYSTOLIC BLOOD PRESSURE: 136 MMHG | WEIGHT: 133.13 LBS | RESPIRATION RATE: 20 BRPM | OXYGEN SATURATION: 98 % | DIASTOLIC BLOOD PRESSURE: 76 MMHG

## 2022-10-05 DIAGNOSIS — Z90.10 POST-MASTECTOMY DEFORMITY OF BREAST: Primary | ICD-10-CM

## 2022-10-05 DIAGNOSIS — N64.89 POST-MASTECTOMY DEFORMITY OF BREAST: Primary | ICD-10-CM

## 2022-10-05 PROCEDURE — 99213 OFFICE O/P EST LOW 20 MIN: CPT | Performed by: STUDENT IN AN ORGANIZED HEALTH CARE EDUCATION/TRAINING PROGRAM

## 2022-10-05 PROCEDURE — G0463 HOSPITAL OUTPT CLINIC VISIT: HCPCS | Mod: 25

## 2022-10-05 PROCEDURE — G0008 ADMIN INFLUENZA VIRUS VAC: HCPCS

## 2022-10-05 NOTE — PATIENT INSTRUCTIONS
Bee's Prosthesis in Replaced by [facility]    [address] [place], [place] [address]  [phone]

## 2022-10-05 NOTE — PROGRESS NOTES
Assessment & Plan     Post-mastectomy deformity of breast  Order placed for new prosthesis and bra  - [facility] DME Mastectomy Supplies; Breast Form/Prosthetic, Bra; Right; 2 (front closure 38D)    She is due for medicare wellness exam, she will schedule this                 No follow-ups on file.    Palmira Lemos MD  [facility] is a 81 year old, presenting for the following health issues:  Establish Care (With Palmira Celis MD )      History of Present Illness       Reason for visit:  NeedanewDr.  Have need of prosthesis    She eats 2-3 servings of fruits and vegetables daily.She consumes 1 sweetened beverage(s) daily.She exercises with enough effort to increase her heart rate 60 or more minutes per day.  She exercises with enough effort to increase her heart rate 3 or less days per week.   She is taking medications regularly.       Establish care  - needs new bra/prosthesis for right sided mastectomy  - her current prosthesis is tearing open and is ruining her current bras  - takes a magnesium supplement for leg pain, it helps, wants to be sure she can continue to take this  - ok with flu shot today          Review of Systems   Constitutional, HEENT, cardiovascular, pulmonary, gi and gu systems are negative, except as otherwise noted.      Objective    /76 (BP Location: Left arm, Patient Position: Sitting, Cuff Size: Adult Regular)   Pulse 88   Temp 98.6  F (37  C) (Tympanic)   Resp 20   Wt 60.4 kg (133 lb 2 oz)   LMP  (LMP Unknown)   SpO2 98%   Breastfeeding No   BMI 22.15 kg/m    Body mass index is 22.15 kg/m .  Physical Exam   GENERAL: healthy, alert and no distress  RESP: lungs clear to auscultation - no rales, rhonchi or wheezes  BREAST: right sided post mastectomy changes  CV: regular rate and rhythm, normal S1 S2, no S3 or S4, no murmur, click or rub, no peripheral edema and peripheral pulses strong  MS: no gross  musculoskeletal defects noted, no edema  SKIN: no suspicious lesions or rashes  PSYCH: mentation appears normal, affect normal/bright                    DME (Durable Medical Equipment) Orders and Documentation  Orders Placed This Encounter   Procedures     [facility] DME Mastectomy Supplies; Breast Form/Prosthetic, Bra; Right; 2 (front closure 38D)      The patient was assessed and it was determined the patient is in need of the following listed DME Supplies/Equipment. Please complete supporting documentation below to demonstrate medical necessity.      DME All Other Item(s) Documentation    List reason for need and supporting documentation for medical necessity below for each DME item.     1. Post right mastectomy

## 2022-10-05 NOTE — NURSING NOTE
Patient presents to clinic for establish care visit with Palmira Celis MD.    Medication Rec Complete  Jeanette Cruz LPN............10/5/2022 9:49 AM

## 2023-02-01 ENCOUNTER — OFFICE VISIT (OUTPATIENT)
Dept: INTERNAL MEDICINE | Facility: OTHER | Age: 83
End: 2023-02-01
Attending: INTERNAL MEDICINE
Payer: COMMERCIAL

## 2023-02-01 VITALS
SYSTOLIC BLOOD PRESSURE: 134 MMHG | OXYGEN SATURATION: 98 % | WEIGHT: 128 LBS | RESPIRATION RATE: 18 BRPM | BODY MASS INDEX: 21.3 KG/M2 | TEMPERATURE: 97 F | HEART RATE: 94 BPM | DIASTOLIC BLOOD PRESSURE: 80 MMHG

## 2023-02-01 DIAGNOSIS — S60.222A TRAUMATIC ECCHYMOSIS OF LEFT HAND, INITIAL ENCOUNTER: Primary | ICD-10-CM

## 2023-02-01 PROCEDURE — 99213 OFFICE O/P EST LOW 20 MIN: CPT | Performed by: INTERNAL MEDICINE

## 2023-02-01 PROCEDURE — G0463 HOSPITAL OUTPT CLINIC VISIT: HCPCS

## 2023-02-01 ASSESSMENT — ENCOUNTER SYMPTOMS
CONSTITUTIONAL NEGATIVE: 1
RESPIRATORY NEGATIVE: 1

## 2023-02-01 ASSESSMENT — PAIN SCALES - GENERAL: PAINLEVEL: NO PAIN (0)

## 2023-02-01 NOTE — PROGRESS NOTES
ICD-10-CM    1. Traumatic ecchymosis of left hand, initial encounter  S60.222A         Reviewed with the patient that she likely just had a minor injury which caused some bleeding in the subsequent ecchymosis.  I also did point out that taking the ibuprofen is reasonable but it probably does potentiate her tendency for bleeding.  Reassurance.  Follow-up as needed.      Tyson Sims is a 82 year old, presenting for the following health issues:  Hand Problem      She developed bruising on her left hand yesterday.  She admits that she was helping a friend clean house but she did not really remember injuring it in any fashion.  She is in today to make sure that it is nothing of concern or consequence.  There is no pain associated with it.  She does take 2 ibuprofen at bedtime to help her sleep.  She is otherwise quite healthy and has no other complaints.         Current Outpatient Medications   Medication     acetaminophen (TYLENOL) 500 MG tablet     amoxicillin (AMOXIL) 500 MG capsule     ascorbic acid (VITAMIN C) 500 MG tablet     calcium carbonate-vitamin D (OS-CARTER) 500-400 MG-UNIT tablet     ibuprofen (ADVIL/MOTRIN) 200 MG capsule     Multiple Vitamins-Minerals (MULTI COMPLETE PO)     vitamin E (TOCOPHEROL) 400 units (180 mg) capsule     No current facility-administered medications for this visit.         Review of Systems   Constitutional: Negative.    Respiratory: Negative.             Objective    /80   Pulse 94   Temp 97  F (36.1  C) (Temporal)   Resp 18   Wt 58.1 kg (128 lb)   LMP  (LMP Unknown)   SpO2 98%   BMI 21.30 kg/m    Body mass index is 21.3 kg/m .  Physical Exam  Vitals and nursing note reviewed.   Constitutional:       General: She is not in acute distress.     Appearance: Normal appearance. She is not ill-appearing, toxic-appearing or diaphoretic.   Musculoskeletal:      Comments: She has typical osteoarthritis changes of her hand.  There was ecchymosis over the dorsum of the  hand and extending a little bit into the second digit.  There was no tenderness with palpation.  Range of motion was full.   Neurological:      Mental Status: She is alert.

## 2023-04-03 ENCOUNTER — OFFICE VISIT (OUTPATIENT)
Dept: FAMILY MEDICINE | Facility: OTHER | Age: 83
End: 2023-04-03
Attending: FAMILY MEDICINE
Payer: MEDICARE

## 2023-04-03 ENCOUNTER — HOSPITAL ENCOUNTER (OUTPATIENT)
Dept: GENERAL RADIOLOGY | Facility: OTHER | Age: 83
Discharge: HOME | End: 2023-04-03
Attending: FAMILY MEDICINE
Payer: MEDICARE

## 2023-04-03 VITALS
DIASTOLIC BLOOD PRESSURE: 64 MMHG | OXYGEN SATURATION: 98 % | RESPIRATION RATE: 18 BRPM | HEART RATE: 83 BPM | BODY MASS INDEX: 21.43 KG/M2 | SYSTOLIC BLOOD PRESSURE: 134 MMHG | WEIGHT: 128.8 LBS

## 2023-04-03 DIAGNOSIS — M25.511 ACUTE PAIN OF RIGHT SHOULDER: ICD-10-CM

## 2023-04-03 DIAGNOSIS — M25.511 ACUTE PAIN OF RIGHT SHOULDER: Primary | ICD-10-CM

## 2023-04-03 LAB
ALBUMIN SERPL BCG-MCNC: 4 G/DL (ref 3.5–5.2)
ALP SERPL-CCNC: 81 U/L (ref 35–104)
ALT SERPL W P-5'-P-CCNC: 12 U/L (ref 10–35)
ANION GAP SERPL CALCULATED.3IONS-SCNC: 5 MMOL/L (ref 7–15)
AST SERPL W P-5'-P-CCNC: 16 U/L (ref 10–35)
BILIRUB SERPL-MCNC: <0.2 MG/DL
BUN SERPL-MCNC: 21.4 MG/DL (ref 8–23)
CALCIUM SERPL-MCNC: 8.9 MG/DL (ref 8.8–10.2)
CHLORIDE SERPL-SCNC: 102 MMOL/L (ref 98–107)
CREAT SERPL-MCNC: 0.7 MG/DL (ref 0.51–0.95)
DEPRECATED HCO3 PLAS-SCNC: 32 MMOL/L (ref 22–29)
ERYTHROCYTE [DISTWIDTH] IN BLOOD BY AUTOMATED COUNT: 13.2 % (ref 10–15)
GFR SERPL CREATININE-BSD FRML MDRD: 86 ML/MIN/1.73M2
GLUCOSE SERPL-MCNC: 112 MG/DL (ref 70–99)
HCT VFR BLD AUTO: 39.4 % (ref 35–47)
HGB BLD-MCNC: 12.8 G/DL (ref 11.7–15.7)
HOLD SPECIMEN: NORMAL
MCH RBC QN AUTO: 32.5 PG (ref 26.5–33)
MCHC RBC AUTO-ENTMCNC: 32.5 G/DL (ref 31.5–36.5)
MCV RBC AUTO: 100 FL (ref 78–100)
PLATELET # BLD AUTO: 204 10E3/UL (ref 150–450)
POTASSIUM SERPL-SCNC: 4.5 MMOL/L (ref 3.4–5.3)
PROT SERPL-MCNC: 7 G/DL (ref 6.4–8.3)
RBC # BLD AUTO: 3.94 10E6/UL (ref 3.8–5.2)
SODIUM SERPL-SCNC: 139 MMOL/L (ref 136–145)
WBC # BLD AUTO: 4.6 10E3/UL (ref 4–11)

## 2023-04-03 PROCEDURE — G0463 HOSPITAL OUTPT CLINIC VISIT: HCPCS | Mod: 25

## 2023-04-03 PROCEDURE — G0463 HOSPITAL OUTPT CLINIC VISIT: HCPCS

## 2023-04-03 PROCEDURE — 99213 OFFICE O/P EST LOW 20 MIN: CPT | Performed by: FAMILY MEDICINE

## 2023-04-03 PROCEDURE — 80053 COMPREHEN METABOLIC PANEL: CPT | Mod: ZL | Performed by: FAMILY MEDICINE

## 2023-04-03 PROCEDURE — 85027 COMPLETE CBC AUTOMATED: CPT | Mod: ZL | Performed by: FAMILY MEDICINE

## 2023-04-03 PROCEDURE — 36415 COLL VENOUS BLD VENIPUNCTURE: CPT | Mod: ZL | Performed by: FAMILY MEDICINE

## 2023-04-03 PROCEDURE — 73030 X-RAY EXAM OF SHOULDER: CPT | Mod: RT

## 2023-04-03 ASSESSMENT — PAIN SCALES - GENERAL: PAINLEVEL: WORST PAIN (10)

## 2023-04-03 NOTE — NURSING NOTE
"Chief Complaint   Patient presents with     Shoulder Pain     Right shoulder pain, started getting worst in the last 3-4 months.   Had all her lymph node removed with breast removal.        Initial /64 (BP Location: Right arm, Patient Position: Sitting, Cuff Size: Adult Regular)   Pulse 83   Resp 18   Wt 58.4 kg (128 lb 12.8 oz)   LMP  (LMP Unknown)   SpO2 98%   BMI 21.43 kg/m   Estimated body mass index is 21.43 kg/m  as calculated from the following:    Height as of 12/1/20: 1.651 m (5' 5\").    Weight as of this encounter: 58.4 kg (128 lb 12.8 oz).  Medication Reconciliation: complete    Lynda Helms LPN    Advance Care Directive reviewed    "

## 2023-04-03 NOTE — PROGRESS NOTES
Assessment & Plan       ICD-10-CM    1. Acute pain of right shoulder  M25.511 XR Shoulder Right 2 Views     Orthopedic  Referral     Comprehensive Metabolic Panel     CBC W PLT No Diff     Extra SST Tube (LAB USE ONLY)     Comprehensive Metabolic Panel     CBC W PLT No Diff        Patient points to her AC joint as the primary source of pain.  However she does have very limited range of motion of her shoulder.  Referred to Ortho for possible AC joint injection, which may be complicated by calcification around this joint.  Did discuss the possibility of an MRI, but will hold off for now to see if the injection helps with her symptom management.  Could also consider physical therapy, depending on reaction to injection.  Patient is encouraged to start Tylenol at 1000 mg 3 times a day scheduled.  She has been using ibuprofen, discussed potential risks associated with this medication.  She will continue to use sparingly if labs obtained today are reassuring.         No follow-ups on file.    Za Rivera MD  [facility] is a 82 year old, presenting for the following health issues:  Shoulder Pain (Right shoulder pain, started getting worst in the last 3-4 months. /Had all her lymph node removed with breast removal. )        4/3/2023     2:37 PM   Additional Questions   Roomed by Lynda   Accompanied by self     Shoulder Pain    History of Present Illness       Reason for visit:  I have terrible pain in my right shahid shoulder  Symptom onset:  More than a month  Symptom intensity:  Severe  Symptom progression:  Worsening  Had these symptoms before:  Yes  Has tried/received treatment for these symptoms:  No  What makes it worse:  Whenever i usemyarm  What makes it better:  Heatingpad but only lasts awhile    She eats 2-3 servings of fruits and vegetables daily.She consumes 1 sweetened beverage(s) daily.She exercises with enough effort to increase her heart rate 9 or  less minutes per day.  She exercises with enough effort to increase her heart rate 3 or less days per week.   She is taking medications regularly.               Objective    /64 (BP Location: Right arm, Patient Position: Sitting, Cuff Size: Adult Regular)   Pulse 83   Resp 18   Wt 58.4 kg (128 lb 12.8 oz)   LMP  (LMP Unknown)   SpO2 98%   BMI 21.43 kg/m    Body mass index is 21.43 kg/m .  Physical Exam  Constitutional:       Appearance: She is well-developed.   HENT:      Right Ear: External ear normal.      Left Ear: External ear normal.   Eyes:      General: No scleral icterus.     Conjunctiva/sclera: Conjunctivae normal.   Cardiovascular:      Rate and Rhythm: Normal rate.   Pulmonary:      Effort: Pulmonary effort is normal. No respiratory distress.   Musculoskeletal:      Comments: Patient has pain with very light palpation to the AC joint on the right.  She also has discomfort in the area of the deltoid.  She has decreased range of motion with abduction and internal rotation and external rotation.   Skin:     Findings: No rash.   Neurological:      Mental Status: She is alert.            Results for orders placed or performed during the hospital encounter of 04/03/23   XR Shoulder Right 2 Views     Status: None    Narrative    Exam: XR SHOULDER RIGHT 2 VIEWS     History:Female, age 82 years, Acute pain of right shoulder    Comparison:  No relevant prior imaging.    Technique: Two views are submitted    Findings: Bones are normally mineralized. No evidence of acute or  subacute fracture.  No evidence of dislocation.           Impression    Impression:  No evidence of acute or subacute bony abnormality.     Calcific tendinosis of the rotator cuff.    Moderate hypertrophic degenerative change at the AC joint.    MARIAN PONCE MD         SYSTEM ID:  [id number]

## 2023-04-03 NOTE — LETTER
April 5, 2023      [facility] Hoyt     [facility] [id number]        Dear ,    We are writing to inform you of your test results.    Your test results fall within the expected range(s) or remain unchanged from previous results.  Please continue with current treatment plan.    Resulted Orders   Extra SST Tube (LAB USE ONLY)   Result Value Ref Range    Hold Specimen x    Comprehensive Metabolic Panel   Result Value Ref Range    Sodium 139 136 - 145 mmol/L    Potassium 4.5 3.4 - 5.3 mmol/L    Chloride 102 98 - 107 mmol/L    Carbon Dioxide (CO2) 32 (H) 22 - 29 mmol/L    Anion Gap 5 (L) 7 - 15 mmol/L    Urea Nitrogen 21.4 8.0 - 23.0 mg/dL    Creatinine 0.70 0.51 - 0.95 mg/dL    Calcium 8.9 8.8 - 10.2 mg/dL    Glucose 112 (H) 70 - 99 mg/dL    Alkaline Phosphatase 81 35 - 104 U/L    AST 16 10 - 35 U/L    ALT 12 10 - 35 U/L    Protein Total 7.0 6.4 - 8.3 g/dL    Albumin 4.0 3.5 - 5.2 g/dL    Bilirubin Total <0.2 <=1.2 mg/dL    GFR Estimate 86 >60 mL/min/1.73m2      Comment:      eGFR calculated using 2021 CKD-EPI equation.   CBC W PLT No Diff   Result Value Ref Range    WBC Count 4.6 4.0 - 11.0 10e3/uL    RBC Count 3.94 3.80 - 5.20 10e6/uL    Hemoglobin 12.8 11.7 - 15.7 g/dL    Hematocrit 39.4 35.0 - 47.0 %     78 - 100 fL    MCH 32.5 26.5 - 33.0 pg    MCHC 32.5 31.5 - 36.5 g/dL    RDW 13.2 10.0 - 15.0 %    Platelet Count 204 150 - 450 10e3/uL       If you have any questions or concerns, please call the clinic at the number listed above.       Sincerely,      Za Rivera MD

## 2023-04-03 NOTE — PATIENT INSTRUCTIONS
Your shoulder primarily hurts in the AC joint. This is also where we see most of the arthritis on the XR. I think you might feel better if we tried a steroid injection, but this will need to be done with ultrasound due to the location and the calcification around this area.     We will get you scheduled with one of our ortho providers for this.     If ongoing symptoms, we could also consider further imaging with an MRI.     In the meantime, take tylenol 1000 mg three times day scheduled, and then ibuprofen just as needed.     Try ice or heat to see if this gives you any relief. Try sleeping with a pillow under your arm, sometimes this helps

## 2023-04-12 ENCOUNTER — OFFICE VISIT (OUTPATIENT)
Dept: FAMILY MEDICINE | Facility: OTHER | Age: 83
End: 2023-04-12
Attending: FAMILY MEDICINE
Payer: MEDICARE

## 2023-04-12 VITALS
SYSTOLIC BLOOD PRESSURE: 142 MMHG | DIASTOLIC BLOOD PRESSURE: 92 MMHG | TEMPERATURE: 97.6 F | BODY MASS INDEX: 21.63 KG/M2 | WEIGHT: 130 LBS | RESPIRATION RATE: 16 BRPM | HEART RATE: 82 BPM

## 2023-04-12 DIAGNOSIS — M19.011 ARTHRITIS OF RIGHT ACROMIOCLAVICULAR JOINT: ICD-10-CM

## 2023-04-12 DIAGNOSIS — M19.011 ARTHRITIS OF RIGHT GLENOHUMERAL JOINT: ICD-10-CM

## 2023-04-12 DIAGNOSIS — M75.41 SUBACROMIAL IMPINGEMENT OF RIGHT SHOULDER: ICD-10-CM

## 2023-04-12 DIAGNOSIS — S46.811A TEAR OF RIGHT INFRASPINATUS TENDON, INITIAL ENCOUNTER: ICD-10-CM

## 2023-04-12 DIAGNOSIS — M75.31 CALCIFIC TENDINITIS OF RIGHT SHOULDER: Primary | ICD-10-CM

## 2023-04-12 PROCEDURE — G0463 HOSPITAL OUTPT CLINIC VISIT: HCPCS | Mod: 25

## 2023-04-12 PROCEDURE — 250N000009 HC RX 250: Performed by: FAMILY MEDICINE

## 2023-04-12 PROCEDURE — 20610 DRAIN/INJ JOINT/BURSA W/O US: CPT | Performed by: FAMILY MEDICINE

## 2023-04-12 PROCEDURE — 99214 OFFICE O/P EST MOD 30 MIN: CPT | Mod: 25 | Performed by: FAMILY MEDICINE

## 2023-04-12 PROCEDURE — 250N000011 HC RX IP 250 OP 636: Performed by: FAMILY MEDICINE

## 2023-04-12 RX ORDER — LIDOCAINE HYDROCHLORIDE 10 MG/ML
2 INJECTION, SOLUTION EPIDURAL; INFILTRATION; INTRACAUDAL; PERINEURAL ONCE
Status: COMPLETED | OUTPATIENT
Start: 2023-04-12 | End: 2023-04-12

## 2023-04-12 RX ORDER — DEXAMETHASONE SODIUM PHOSPHATE 10 MG/ML
10 INJECTION, SOLUTION INTRAMUSCULAR; INTRAVENOUS ONCE
Status: COMPLETED | OUTPATIENT
Start: 2023-04-12 | End: 2023-04-12

## 2023-04-12 RX ORDER — BUPIVACAINE HYDROCHLORIDE 5 MG/ML
2 INJECTION, SOLUTION EPIDURAL; INTRACAUDAL ONCE
Status: COMPLETED | OUTPATIENT
Start: 2023-04-12 | End: 2023-04-12

## 2023-04-12 RX ADMIN — DEXAMETHASONE SODIUM PHOSPHATE 10 MG: 10 INJECTION, SOLUTION INTRAMUSCULAR; INTRAVENOUS at 10:50

## 2023-04-12 RX ADMIN — LIDOCAINE HYDROCHLORIDE 2 ML: 10 INJECTION, SOLUTION INFILTRATION; PERINEURAL at 10:50

## 2023-04-12 RX ADMIN — BUPIVACAINE HYDROCHLORIDE 10 MG: 5 INJECTION, SOLUTION EPIDURAL; INTRACAUDAL; PERINEURAL at 10:50

## 2023-04-12 ASSESSMENT — PAIN SCALES - GENERAL: PAINLEVEL: WORST PAIN (10)

## 2023-04-12 NOTE — PATIENT INSTRUCTIONS
PROCEDURE AFTERCARE    What:  While in the Sports Medicine Clinic a procedure was performed with a needle:  an injection, aspiration, or trephination.  This was performed to treat and/or diagnose an underlying musculoskeletal problem.    Following your procedure:  Any bandages can be removed 1-2 hours after leaving the office  The area around the procedure site may bruise slightly (potentially more so if you are on a blood thinning medication) in the coming week  When a needle breaks the skin there can be some soreness at the site of entry later that day or the next that usually resolves after 1-2 days.  If quite bothersome, this can be alleviated by icing (15-20min on and 30-60 minutes off and repeated as needed) or taking over-the-counter 325mg-650mg Tylenol (acetaminophen, unless contraindicated for other medical reasons)    What to watch out for:  Bleeding:  any bleeding from the injection site that does not stop  Infection:  typically presents within the first week - if you think you have developed any of these symptoms, please contact the clinic or go to the Emergency Room  Deep redness that grows away from joint/procedure site  Warmth about the area  Significantly worsening pain  Pus-like drainage at/around the procedure site  Fever/chills  Allergic reaction:  Itching/hives  Rash  Throat swelling or difficultly breath (this should prompt emergent medical attention in the Emergency Room or by calling [phone])  Anything else you are unsure about    These side effects and adverse reactions are extremely rare but if they develop you should be seen to have them evaluated.

## 2023-04-12 NOTE — NURSING NOTE
"Pt presents to clinic for consult of right shoulder pain.  States \" no injury, just arthritis\"  "

## 2023-04-12 NOTE — PROGRESS NOTES
Sports Medicine Office Note    HPI:  82-year-old female coming in for evaluation of right shoulder pain.  Her pain has been present for several years.  No inciting event or trauma.  She localizes the pain to the lateral aspect of the shoulder, extending down the lateral aspect of the upper arm.  She rates her pain at 10/10.  She characterized the pain as achy.  Lifting her shoulder is particularly bothersome.  Sleeping is also difficult due to this pain.  She has tried heat, ice, and OTC medications.  She was seen in primary care clinic on 4/3.  An x-ray was ordered.  She comes in today for consideration of an injection.      EXAM:  BP (!) 142/92   Pulse 82   Temp 97.6  F (36.4  C) (Temporal)   Resp 16   Wt 59 kg (130 lb)   LMP  (LMP Unknown)   BMI 21.63 kg/m    MUSCULOSKELETAL EXAM:  RIGHT SHOULDER  Inspection:  -No gross deformity  -No bruising or swelling  -Scars:  None    Tenderness to palpation of the:  -SC joint:  Negative  -AC joint: Mild pain  -Clavicle:  Negative  -Biceps tendon in bicipital groove:  Negative  -Deltoid musculature: Mild pain at the greater tuberosity  -Upper trapezius musculature:  Negative    Range of Motion:  -Active flexion:  110 left, 40 right (160 passively)  -Active abduction:  110 left, 80 right (160 passively)    Strength:  -Supraspinatus:  4+/5  -Infraspinatus:  4-/5  -Subscapularis:  5/5  -Deltoid:  4+/5    Special Tests:  -Maria Elena test: Positive pain and slight weakness  -Benavides test: Positive  -Neer test: Positive  -Mid-arc pain: Present  -Speeds test: Positive  -Crossarm adduction test: Positive  -Yergason test:  Negative  -Lag sign:  Negative    Other:  -Intact sensation to light touch distally.  -No signs of cyanosis. Normal skin temperature of the upper extremity.  -Elbow:  No gross deformity. Full range of motion.  -Hand/wrist:  No gross deformity. Full range of motion.  -Left shoulder:  No gross deformity. No palpable tenderness. Normal  strength.      IMAGIN/3/2023: 2 view right shoulder x-ray  - Calcific tendinopathy of the rotator cuff is noted.  There also appears to be calcific hypertrophy of the AC joint.  No acute bony abnormalities.      ASSESSMENT/PLAN:  Diagnoses and all orders for this visit:  Calcific tendinitis of right shoulder  -     Orthopedic  Referral  -     Bupivacaine 0.5% 2mL Infiltration  -     dexamethasone PF (DECADRON) injection 10 mg  -     lidocaine (PF) (XYLOCAINE) 1 % injection 2 mL  -     DRAIN/INJECT LARGE JOINT/BURSA  -     Physical Therapy Referral; Future  Subacromial impingement of right shoulder  -     Bupivacaine 0.5% 2mL Infiltration  -     dexamethasone PF (DECADRON) injection 10 mg  -     lidocaine (PF) (XYLOCAINE) 1 % injection 2 mL  -     DRAIN/INJECT LARGE JOINT/BURSA  -     Physical Therapy Referral; Future  Arthritis of right acromioclavicular joint  -     Physical Therapy Referral; Future  Arthritis of right glenohumeral joint  -     Physical Therapy Referral; Future  Tear of right infraspinatus tendon, initial encounter  -     Physical Therapy Referral; Future    82-year-old female with findings of impingement syndrome on evaluation.  X-rays from 4/3 were personally reviewed in the office with findings as demonstrated above by my interpretation.  X-rays reveal calcific tendinopathy of the right shoulder.  This explains the majority of her pain that she presents with today.  I have discussed her care with the provider who saw her on 4/3.  It sounds like at that time she was experiencing much more pain from the AC joint.  She is not having as much pain at that joint today.  We discussed treatment options to include rest, activity modification, ice, topical medications, oral medications, injections, and surgical referral.  After reviewing the risks/benefits, the patient elects to proceed with an injection into the right subacromial space.  See procedure note below:    PROCEDURE:  Subacromial  Injection of the Right Shoulder      PROCEDURAL PAUSE:    A procedural pause was conducted to verify:  correct patient identity, procedure to be performed, and as applicable, correct side, site, and correct patient position.      INFORMED CONSENT:   I discussed the risks, possible benefits, and alternatives to injection.  Following denial of allergy and review of potential side effects and complications (including but not necessarily limited to infection, allergic reaction, fat necrosis, skin depigmentation, local tissue breakdown, systemic effects of corticosteroids, elevation of blood glucose, injury to soft tissue and/or nerves, and seizure), all questions were answered, and consent was given to proceed.  Patient verbalized understanding.      PROCEDURE DETAILS:    Side and site were marked, and a time out was performed to verify appropriate patient identifiers.  Following this the right posterolateral shoulder, just inferior to the acromion, was prepped with chlorhexidine.  Utilizing a 25-gauge needle the right subacromial bursa was injected using a posterolateral to anteromedial approach with 2mL of 1% Lidocaine, 2mL of 0.5% bupivacaine, and 1mL dexamethasone (10mg/mL).  0mL was wasted.      The patient tolerated the procedure without complication and was discharged in good condition after a short observation period.  The patient was instructed to contact me regarding any questions pertaining to the procedure.      DIAGNOSIS:    -Successful injection of the right subacromial bursa without immediate complication      PLAN:   -Post-procedure care reviewed, including avoiding submersion of the injection site for 48 hours   -Return precautions reviewed for signs/symptoms that would be concerning for infection   -The patient was instructed to ice and take APAP this evening if needed   -Referral placed to physical therapy  -Return to clinic as needed      Zachery Saenz MD  4/12/2023  10:09 AM    Total time spent with  this patient was 38 minutes which included chart review, visualization and independent interpretation of images, time spent with the patient, and documentation.    Procedure time:  4 minute(s)

## 2023-05-10 ENCOUNTER — HOSPITAL ENCOUNTER (OUTPATIENT)
Dept: PHYSICAL THERAPY | Facility: OTHER | Age: 83
Setting detail: THERAPIES SERIES
Discharge: HOME | End: 2023-05-10
Attending: FAMILY MEDICINE
Payer: MEDICARE

## 2023-05-10 DIAGNOSIS — M75.41 SUBACROMIAL IMPINGEMENT OF RIGHT SHOULDER: ICD-10-CM

## 2023-05-10 DIAGNOSIS — S46.811A TEAR OF RIGHT INFRASPINATUS TENDON, INITIAL ENCOUNTER: ICD-10-CM

## 2023-05-10 DIAGNOSIS — M19.011 ARTHRITIS OF RIGHT ACROMIOCLAVICULAR JOINT: ICD-10-CM

## 2023-05-10 DIAGNOSIS — M75.31 CALCIFIC TENDINITIS OF RIGHT SHOULDER: ICD-10-CM

## 2023-05-10 DIAGNOSIS — M19.011 ARTHRITIS OF RIGHT GLENOHUMERAL JOINT: ICD-10-CM

## 2023-05-10 PROCEDURE — 97110 THERAPEUTIC EXERCISES: CPT | Mod: GP,PO

## 2023-05-10 PROCEDURE — 97161 PT EVAL LOW COMPLEX 20 MIN: CPT | Mod: GP,PO

## 2023-05-10 NOTE — PROGRESS NOTES
[facility]    OUTPATIENT PHYSICAL THERAPY ORTHOPEDIC EVALUATION  PLAN OF TREATMENT FOR OUTPATIENT REHABILITATION  (COMPLETE FOR INITIAL CLAIMS ONLY)  Patient's Last Name, First Name, M.I.  YOB: 1940  Bethany Hoyt    Provider s Name:  [facility]   Medical Record No.  [id number]   Start of Care Date:  05/10/23   Onset Date:  01/11/23   Type:     _X__PT   ___OT   ___SLP Medical Diagnosis:  Calcific tendinitis of right shoulder (M75.31)     PT Diagnosis:  Impaired mobility of the right upper extremity due to right shoulder rotator cuff impingement syndrome/right AC joint degeneration   Visits from SOC:  1      _________________________________________________________________________________  Plan of Treatment/Functional Goals:  joint mobilization, manual therapy, ROM, strengthening, stretching     Cryotherapy, Hot packs, Ultrasound  Phonophoresis with 10% ketoprofen/gel mix may be used  Goals  Goal Identifier: Pain  Goal Description: Patient will report that she is able to easily dress the upper extremities, complete all grooming tasks and complete all cooking tasks all on a daily basis without limit due to right shoulder pain in excess of 2/10 and 12 weeks  Target Date: 08/10/23    Goal Identifier: Mobility  Goal Description: Patient will demonstrate right shoulder flexion to at least 140 degrees, abduction to 135 degrees, internal rotation to 65 degrees allowing reach behind the back for fasting clothing, external rotation to at least 75 degrees allowing grooming tasks.  Improve mobility will allow patient to use the right upper extremity for lifting and removing items from overhead shelves, hanging close on upper closet rides, cleaning tasks which require use of the upper extremities and elevated positions.  Should be able to complete these tasks on an as  needed and/or daily basis.  This goal will be completed in 12 weeks  Target Date: 08/10/23    Goal Identifier: Strength  Goal Description: Patient will demonstrate right shoulder strength of at least grade 4-4+/5 MMT in the right rotator cuff/shoulder and scapular complex.  Restored strength and stability of the shoulder will allow patient to provide physical care for her , lift all necessary items including pouring coffee, pouring holloway, lifting cooking items to overhead shelves, allow use of brooms and vacuum  and other cleaning equipment on an as needed basis throughout the week without limitation.  This goal will be accomplished 12 weeks  Target Date: 08/10/23                                                           Therapy Frequency:  2 times/Week  Predicted Duration of Therapy Intervention:  12 weeks    Jorge Alberto Goode, PT                 I CERTIFY THE NEED FOR THESE SERVICES FURNISHED UNDER        THIS PLAN OF TREATMENT AND WHILE UNDER MY CARE     (Physician co-signature of this document indicates review and certification of the therapy plan).                     Certification Date From:  05/10/23   Certification Date To:  08/10/23    Referring Provider:  Zachery Elizalde MD    Initial Assessment        See Epic Evaluation Start of Care Date: 05/10/23

## 2023-05-10 NOTE — PROGRESS NOTES
05/10/23 0900   General Information   Type of Visit Initial OP Ortho PT Evaluation   Start of Care Date 05/10/23   Referring Physician Zachery Elizalde MD   Patient/Family Goals Statement To be able to use her right shoulder w/o pain   Orders Evaluate and Treat   Date of Order 04/12/23   Certification Required? Yes   Medical Diagnosis Calcific tendinitis of right shoulder (M75.31)   Surgical/Medical history reviewed Yes   Special Instructions None   Body Part(s)   Body Part(s) Shoulder   Presentation and Etiology   Pertinent history of current problem (include personal factors and/or comorbidities that impact the POC) This patient is a 82-year-old female whom presents to physical therapy with complaint of several month history of significant right shoulder pain. She recieved a cortisone injection on 4/12/2023.  Much better today. Rates right shoulder pain today as 1-2/10 VAS. She does report that her left shoulder can also be quite painful. She previously used  tylenol which is helpful. Reports a several month history of right shoulder pain. She still has trouble with dressing, grooming and gadening tasks. 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  Has a history of B NARENDRA.   Impairments A. Pain;D. Decreased ROM;E. Decreased flexibility;F. Decreased strength and endurance   Functional Limitations perform desired leisure / sports activities;perform activities of daily living   Symptom Location Right shoulder, rarely left shoulder   How/Where did it occur With repetition/overuse;From insidious onset;From Degenerative Joint Disease   Onset date of current episode/exacerbation 01/11/23   Chronicity Chronic   Pain rating (0-10 point scale) Best (/10);Worst (/10)   Best (/10) 0-1   Worst (/10) 3-4   Pain quality C. Aching;B. Dull   Frequency of pain/symptoms C. With activity   Pain/symptoms are: Worse  during the night   Pain/symptoms exacerbated by C. Lifting;G. Certain positions;H. Overhead reach;L. Work tasks   Pain/symptoms eased by C. Rest;E. Changing positions;I. OTC medication(s);H. Cold   Progression of symptoms since onset: Improved   Prior Level of Function   Prior Level of Function-Mobility NML   Prior Level of Function-ADLs NML   Current Level of Function   Current Community Support Family/friend caregiver   Patient role/employment history F. Retired   [facility]   Home/community accessibility NML   Fall Risk Screen   Fall screen completed by PT   Have you fallen 2 or more times in the past year? No   Have you fallen and had an injury in the past year? No   Is patient a fall risk? No   Abuse Screen (yes response referral indicated)   Feels Unsafe at Home or Work/School no   Feels Threatened by Someone no   Does Anyone Try to Keep You From Having Contact with Others or Doing Things Outside Your Home? no   Physical Signs of Abuse Present no   Shoulder Objective Findings   Side (if bilateral, select both right and left) Right   Cervical Screen (ROM, quadrant) Noted tightness of the right upper trapezius otherwise normal cervical motion   Thoracic Mobility Screen Reduced thoracic extension   Scapulothoracic Rhythm Noted right scapular elevation   Pec Minor (supine) Flexibility Noted bilateral tightness   Shoulder Flexibility Comments Passive range of motion is significantly better than active range of motion and generally equal right to left   Neer's Test Positive   Benavides-Tarik Test Positive   Bursa Test Positive   Munday's Test Negative   Load and Shift Test Negative   Relocation Test Negative   Crossover Test Positive   Palpation 2-3/4 tenderness with palpation of the long head of the biceps, the supraspinatus insertion into the rotator cuff, and 2/4 tenderness of the infraspinatus tendon insertion into the rotator cuff.  1-2/4 tenderness with palpation of the right AC joint    Accessory Motion/Joint Mobility Limited SC and AC joint mobility on the right side   Observation FWD head,   Integumentary  Normal   Posture Forward head and rounded shoulder posture with increased thoracic kyphosis   Right Shoulder Flexion AROM 115   Right Shoulder Flexion PROM 135   Right Shoulder Abduction AROM 90   Right Shoulder Abduction PROM 130   Right Shoulder ER AROM Behind neck with compensation and pain   Right Shoulder ER PROM 65   Right Shoulder IR AROM difficult to reach to mid lumbar with pain.   Right Shoulder IR PROM 59   Right Shoulder Flexion Strength 3 -   Right Shoulder Abduction Strength 3 -   Right Shoulder ER Strength 3+   Right Shoulder IR Strength 4 -   Right Shoulder Extension Strength 4 -   Right Mid Trapezius Strength 4 -   Right Lower Trapezius Strength Unable to test   Planned Therapy Interventions   Planned Therapy Interventions joint mobilization;manual therapy;ROM;strengthening;stretching   Planned Modality Interventions   Planned Modality Interventions Cryotherapy;Hot packs;Ultrasound   Planned Modality Interventions Comments Phonophoresis with 10% ketoprofen/gel mix may be used   Clinical Impression   Criteria for Skilled Therapeutic Interventions Met yes, treatment indicated   PT Diagnosis Impaired mobility of the right upper extremity due to right shoulder rotator cuff impingement syndrome/right AC joint degeneration   Influenced by the following impairments Pain, limited mobility, limited strength   Functional limitations due to impairments Patient has difficulty with the following tasks #1 dressing, #2 eating and drinking, #3 hanging close, #4 lifting or removing items from overhead shelves for cooking, #5 completion of house tasks which require use of heavier tools, #6 difficulty pouring cup of coffee, #7 intermittent difficulty with driving, #8 difficulty with sleep positioning due to shoulder pain   Clinical Presentation Stable/Uncomplicated   Clinical Decision Making  (Complexity) Low complexity   Therapy Frequency 2 times/Week   Predicted Duration of Therapy Intervention (days/wks) 12 weeks   Risk & Benefits of therapy have been explained Yes   Patient, Family & other staff in agreement with plan of care Yes   Clinical Impression Comments Patient appears quite healthy and vigorous and enjoys staying busy.   Education Assessment   Preferred Learning Style Listening;Reading;Demonstration;Pictures/video   ORTHO GOALS   PT Ortho Eval Goals 1;2;3   Ortho Goal 1   Goal Identifier Pain   Goal Description Patient will report that she is able to easily dress the upper extremities, complete all grooming tasks and complete all cooking tasks all on a daily basis without limit due to right shoulder pain in excess of 2/10 and 12 weeks   Target Date 08/10/23   Ortho Goal 2   Goal Identifier Mobility   Goal Description Patient will demonstrate right shoulder flexion to at least 140 degrees, abduction to 135 degrees, internal rotation to 65 degrees allowing reach behind the back for fasting clothing, external rotation to at least 75 degrees allowing grooming tasks.  Improve mobility will allow patient to use the right upper extremity for lifting and removing items from overhead shelves, hanging close on upper closet rides, cleaning tasks which require use of the upper extremities and elevated positions.  Should be able to complete these tasks on an as needed and/or daily basis.  This goal will be completed in 12 weeks   Target Date 08/10/23   Ortho Goal 3   Goal Identifier Strength   Goal Description Patient will demonstrate right shoulder strength of at least grade 4-4+/5 MMT in the right rotator cuff/shoulder and scapular complex.  Restored strength and stability of the shoulder will allow patient to provide physical care for her , lift all necessary items including pouring coffee, pouring holloway, lifting cooking items to overhead shelves, allow use of brooms and vacuum  and  other cleaning equipment on an as needed basis throughout the week without limitation.  This goal will be accomplished 12 weeks   Target Date 08/10/23   Total Evaluation Time   PT Eval, Low Complexity Minutes (76551) 35   Therapy Certification   Certification date from 05/10/23   Certification date to 08/10/23   Medical Diagnosis Calcific tendinitis of right shoulder (M75.31)

## 2023-05-17 ENCOUNTER — THERAPY VISIT (OUTPATIENT)
Dept: PHYSICAL THERAPY | Facility: OTHER | Age: 83
End: 2023-05-17
Attending: FAMILY MEDICINE
Payer: MEDICARE

## 2023-05-17 DIAGNOSIS — M75.31 CALCIFIC TENDINITIS OF RIGHT SHOULDER: Primary | ICD-10-CM

## 2023-05-17 PROCEDURE — 97035 APP MDLTY 1+ULTRASOUND EA 15: CPT | Mod: GP

## 2023-05-17 PROCEDURE — 97140 MANUAL THERAPY 1/> REGIONS: CPT | Mod: GP

## 2023-05-17 PROCEDURE — 97110 THERAPEUTIC EXERCISES: CPT | Mod: GP

## 2023-05-24 ENCOUNTER — THERAPY VISIT (OUTPATIENT)
Dept: PHYSICAL THERAPY | Facility: OTHER | Age: 83
End: 2023-05-24
Attending: FAMILY MEDICINE
Payer: MEDICARE

## 2023-05-24 DIAGNOSIS — M75.31 CALCIFIC TENDINITIS OF RIGHT SHOULDER: Primary | ICD-10-CM

## 2023-05-24 PROCEDURE — 97140 MANUAL THERAPY 1/> REGIONS: CPT | Mod: GP

## 2023-05-24 PROCEDURE — 97035 APP MDLTY 1+ULTRASOUND EA 15: CPT | Mod: GP

## 2023-05-24 PROCEDURE — 97110 THERAPEUTIC EXERCISES: CPT | Mod: GP

## 2023-05-30 ENCOUNTER — THERAPY VISIT (OUTPATIENT)
Dept: PHYSICAL THERAPY | Facility: OTHER | Age: 83
End: 2023-05-30
Attending: FAMILY MEDICINE
Payer: MEDICARE

## 2023-05-30 DIAGNOSIS — M19.011 ARTHRITIS OF RIGHT GLENOHUMERAL JOINT: ICD-10-CM

## 2023-05-30 DIAGNOSIS — M19.011 ARTHRITIS OF RIGHT ACROMIOCLAVICULAR JOINT: ICD-10-CM

## 2023-05-30 DIAGNOSIS — M75.31 CALCIFIC TENDINITIS OF RIGHT SHOULDER: Primary | ICD-10-CM

## 2023-05-30 DIAGNOSIS — S46.811A TEAR OF RIGHT INFRASPINATUS TENDON, INITIAL ENCOUNTER: ICD-10-CM

## 2023-05-30 DIAGNOSIS — M75.41 SUBACROMIAL IMPINGEMENT OF RIGHT SHOULDER: ICD-10-CM

## 2023-05-30 PROCEDURE — 97035 APP MDLTY 1+ULTRASOUND EA 15: CPT | Mod: GP,CQ

## 2023-05-30 PROCEDURE — 97110 THERAPEUTIC EXERCISES: CPT | Mod: GP,CQ

## 2023-05-30 PROCEDURE — 97140 MANUAL THERAPY 1/> REGIONS: CPT | Mod: GP,CQ

## 2023-06-05 ENCOUNTER — THERAPY VISIT (OUTPATIENT)
Dept: PHYSICAL THERAPY | Facility: OTHER | Age: 83
End: 2023-06-05
Attending: FAMILY MEDICINE
Payer: MEDICARE

## 2023-06-05 DIAGNOSIS — M75.31 CALCIFIC TENDINITIS OF RIGHT SHOULDER: Primary | ICD-10-CM

## 2023-06-05 PROCEDURE — 97110 THERAPEUTIC EXERCISES: CPT | Mod: GP

## 2023-06-05 PROCEDURE — 97035 APP MDLTY 1+ULTRASOUND EA 15: CPT | Mod: GP

## 2023-06-05 PROCEDURE — 97140 MANUAL THERAPY 1/> REGIONS: CPT | Mod: GP

## 2023-06-06 NOTE — PROGRESS NOTES
DISCHARGE  Reason for Discharge: Patient has made good progress towards all goals.    Equipment Issued: None    Discharge Plan: Patient to continue home program.           06/05/23 1000   Appointment Info   Signing clinician's name / credentials Jorge Alberto Goode PT   Total/Authorized Visits 5   Visits Used 5/10   Medical Diagnosis Right shoulder calcific tendonitis   PT Tx Diagnosis Right shoulder pain, weakness and immobility   Precautions/Limitations None   Progress Note/Certification   Therapy Frequency 1-2 X per week   Certification date from 05/10/23   Certification date to 08/10/23   Progress Note Due Date 06/14/23   Supervision   PT Assistant Visit Number 1   GOALS   PT Goals 3;2   PT Goal 1   Goal Identifier Pain   Goal Description Patient will report that she is able to easily dress the upper extremities, complete all grooming tasks and complete all cooking tasks all on a daily basis without limit due to right shoulder pain in excess of 2/10 and 12 weeks   Rationale to maximize safety and independence within the home;to maximize safety and independence within the community;to maximize safety and independence with transportation;to maximize safety and independence with self cares   Goal Progress 70% met. Diana now has the AROM to enable easier dressing. She is sleeping w/o pain. She will need to continue her HEP to fully achieve her goal.   Target Date 08/10/23   PT Goal 2   Goal Identifier Mobility   Goal Description Patient will demonstrate right shoulder flexion to at least 140 degrees, abduction to 135 degrees, internal rotation to 65 degrees allowing reach behind the back for fasting clothing, external rotation to at least 75 degrees allowing grooming tasks.  Improve mobility will allow patient to use the right upper extremity for lifting and removing items from overhead shelves, hanging items on upper closet rods, cleaning tasks which require use of the upper extremities in elevated positions.   Should be able to complete these tasks on an as needed and/or daily basis.  This goal will be completed in 12 weeks   Rationale to maximize safety and independence within the home;to maximize safety and independence within the community;to maximize safety and independence with transportation;to maximize safety and independence with self cares   Goal Progress Flexion to 138, abduction to 130, EROT allows reach behind the neck, IROT allows reach to the mid back. She is able to hang clothes with less discomfort.   Target Date 08/10/23   PT Goal 3   Goal Identifier Strength   Goal Description Patient will demonstrate right shoulder strength of at least grade 4-4+/5 MMT in the right rotator cuff/shoulder and scapular complex.  Restored strength and stability of the shoulder will allow patient to provide physical care for her , lift all necessary items including pouring coffee, pouring holloway, lifting cooking items to overhead shelves, allow use of brooms and vacuum  and other cleaning equipment on an as needed basis throughout the week without limitation.  This goal will be accomplished 12 weeks   Rationale to maximize safety and independence within the home;to maximize safety and independence within the community;to maximize safety and independence with transportation;to maximize safety and independence with self cares   Goal Progress 70% achieved. R Shoulder flexion 4/5, abd 4/5, EROT 4+/5 and IROT 4+/5. Diana will need to continue with her HEP to develop improved strength allowing easier completion of tasks that require lifting of heavier items such as some pot and pans, cleaning equipment.   Target Date 08/10/23   Subjective Report   Subjective Report Reports her shoulder feels much better. Feels she needs to transition to an independent HEP as her husbands health is placing a demand on her time and she is unconfortable leaving him at home alone.   Objective Measures   Objective Measures Objective  Measure 1;Objective Measure 2;Objective Measure 3   Objective Measure 1   Objective Measure Right shoulder mobility   Details Flexion to 138, abduction to 130, EROT allows reach behind the neck, IROT allows reach to the mid back   Objective Measure 2   Objective Measure Right shoulder strength   Details R Shoulder flexion 4/5, abd 4/5, EROT 4+/5 and IROT 4+/5.   Objective Measure 3   Objective Measure Palpation   Details tenderness reduced by 70%   PT Modalities   PT Modalities Ultrasound   Ultrasound   Ultrasound Minutes (04633) 10   Treatment Detail 10' continuous US at 1 MHx, 1.6 stacy/cm/sq to right anterior shoulder   Ultrasound -Type (does not include 3-5 min prep/cleanup time) Continuous   Intensity 1.6   Duration (does not include the 3-5 min set up/clean up time) 10 min   Location Right anterior shoulder   Positioning supine   Patient Response/Progress Tolerated well.   Frequency 1 MHz   Treatment Interventions (PT)   Interventions Therapeutic Procedure/Exercise;Manual Therapy   Therapeutic Procedure/Exercise   Therapeutic Procedures: strength, endurance, ROM, flexibillity minutes (40388) 20   Ther Proc 1 Reviewed/completed full current HEP   Ther Proc 1 - Details Reviewed final HEP. Supine wand flexion x10. Standing wand: abduction x10, ER x10. Instructed in: doorway isometrics - flex, abd, IR, 5 second hold x5 each, ER - pain so deferred.   Skilled Intervention Yes instruction and demonstration   Patient Response/Progress Patient required additional verbal instruction and tactile cues to complete niranjan exercises efficiently   Manual Therapy   Manual Therapy: Mobilization, MFR, MLD, friction massage minutes (43108) 15   Manual Therapy 1 Mobilization   Manual Therapy 1 - Details Mobs to improve posterior capsule mobility, PROM , manual end range stretch, STM   Skilled Intervention Yes   Patient Response/Progress Tolerated well   Education   Learner/Method Patient   Plan   Home program As above.  Patient's  home exercise program will be reviewed and revised as indicated   Plan for next session Patient will be discharged from PT at this time. She will continue with her HEP and call with any questions. Tghis will allow her to be home providing care for her    Total Session Time   Timed Code Treatment Minutes 45   Total Treatment Time (sum of timed and untimed services) 45   Medicare Claim Information   Medical Diagnosis Calcific tendinitis of right shoulder (M75.31)   PT Diagnosis Impaired mobility of the right upper extremity due to right shoulder rotator cuff impingement syndrome/right AC joint degeneration   Start of Care Date 05/10/23   Onset date of current episode/exacerbation 01/11/23   Certification date from 05/10/23   Session Number   Additional Session Number 1/10       Referring Provider:  Zachery Elizalde

## 2023-11-30 ENCOUNTER — HOSPITAL ENCOUNTER (OUTPATIENT)
Dept: GENERAL RADIOLOGY | Facility: OTHER | Age: 83
Discharge: HOME | End: 2023-11-30
Attending: FAMILY MEDICINE
Payer: MEDICARE

## 2023-11-30 ENCOUNTER — OFFICE VISIT (OUTPATIENT)
Dept: FAMILY MEDICINE | Facility: OTHER | Age: 83
End: 2023-11-30
Attending: FAMILY MEDICINE
Payer: MEDICARE

## 2023-11-30 VITALS
OXYGEN SATURATION: 99 % | RESPIRATION RATE: 22 BRPM | TEMPERATURE: 97.7 F | WEIGHT: 116.6 LBS | BODY MASS INDEX: 19.4 KG/M2 | DIASTOLIC BLOOD PRESSURE: 72 MMHG | HEART RATE: 94 BPM | SYSTOLIC BLOOD PRESSURE: 138 MMHG

## 2023-11-30 DIAGNOSIS — R09.1 PLEURISY: ICD-10-CM

## 2023-11-30 DIAGNOSIS — R07.9 CHEST PAIN, UNSPECIFIED TYPE: Primary | ICD-10-CM

## 2023-11-30 DIAGNOSIS — R07.9 CHEST PAIN, UNSPECIFIED TYPE: ICD-10-CM

## 2023-11-30 LAB
D DIMER PPP FEU-MCNC: 0.53 UG/ML FEU (ref 0–0.5)
HOLD SPECIMEN: NORMAL

## 2023-11-30 PROCEDURE — 36415 COLL VENOUS BLD VENIPUNCTURE: CPT | Mod: ZL | Performed by: FAMILY MEDICINE

## 2023-11-30 PROCEDURE — 85379 FIBRIN DEGRADATION QUANT: CPT | Mod: ZL | Performed by: FAMILY MEDICINE

## 2023-11-30 PROCEDURE — 71046 X-RAY EXAM CHEST 2 VIEWS: CPT

## 2023-11-30 PROCEDURE — 99214 OFFICE O/P EST MOD 30 MIN: CPT | Performed by: FAMILY MEDICINE

## 2023-11-30 PROCEDURE — G0463 HOSPITAL OUTPT CLINIC VISIT: HCPCS

## 2023-11-30 ASSESSMENT — PAIN SCALES - GENERAL: PAINLEVEL: SEVERE PAIN (7)

## 2023-11-30 NOTE — PROGRESS NOTES
SUBJECTIVE:   Bethany Hoyt is a 83 year old female who presents to clinic today for the following health issues: Back pain    Patient arrives here for back pain.  She points to her right mid chest wall where the pain occurs.  She has been using heat and ice.  Associated with a little short of breath.  Occasionally rates up to her right shoulder.  Is been going on for 3 days.  Last night it woke her up.  No trauma no fevers chills no coughing.  She has been taking ibuprofen with good relief.  Patient is had no leg swelling.  No prolonged sitting.    Shoulder Pain          Patient Active Problem List    Diagnosis Date Noted    Subacromial impingement of right shoulder 05/30/2023     Priority: Medium    Arthritis of right acromioclavicular joint 05/30/2023     Priority: Medium    Arthritis of right glenohumeral joint 05/30/2023     Priority: Medium    Tear of right infraspinatus tendon, initial encounter 05/30/2023     Priority: Medium    Calcific tendinitis of right shoulder 05/24/2023     Priority: Medium    Anemia due to blood loss, acute 12/03/2020     Priority: Medium    Status post total replacement of left hip 12/01/2020     Priority: Medium    Primary osteoarthritis of left hip 09/04/2020     Priority: Medium    Chronic left-sided low back pain without sciatica 09/04/2020     Priority: Medium    Hyperlipidemia 01/26/2018     Priority: Medium     Overview:   Mild      Diverticulosis of sigmoid colon 06/19/2017     Priority: Medium    Prepatellar bursitis 03/25/2015     Priority: Medium    Subdeltoid bursitis 03/25/2015     Priority: Medium    H/O adenomatous polyp of colon 03/27/2012     Priority: Medium    Osteoarthrosis, unspecified whether generalized or localized, pelvic region and thigh 10/07/2011     Priority: Medium    Personal history of malignant neoplasm of breast 01/24/2011     Priority: Medium    Disorder of bone and cartilage 01/24/2011     Priority: Medium     Past Medical History:    Diagnosis Date    Acquired absence of right breast and nipple     No Comments Provided    Bilateral carpal tunnel syndrome     No Comments Provided    Contact dermatitis     1/24/2011    Disease of pericardium     No Comments Provided    Disorder of cartilage     1/24/2011    Diverticulosis of intestine without perforation or abscess without bleeding     No Comments Provided    Diverticulosis of large intestine without perforation or abscess without bleeding     3/26/2012    History of colonic polyps     3/27/2012    Localized swelling, mass and lump, left upper limb     9/27/2013    Malignant neoplasm of female breast (H)     1990,estrogen receptor borderline, progesterone receptor positive, treated with Tamoxifen 10 mg. b.i.d. times five years.  Status post chemotherapy times one and Tamoxifen times five years.    Osteoarthritis of hip     10/7/2011    Personal history of malignant neoplasm of breast     1/24/2011      Past Surgical History:   Procedure Laterality Date    ARTHROPLASTY HIP ANTERIOR Left 12/1/2020    Procedure: ARTHROPLASTY, HIP, TOTAL, DIRECT ANTERIOR APPROACH;  Surgeon: Amrik Lu MD;  Location: [facility] OR    AS TOTAL HIP ARTHROPLASTY Right 2015    BACK SURGERY      1959    COLONOSCOPY      5/2002,Diverticulosis    COLONOSCOPY      3/26/2012,Follow up 2017    COLONOSCOPY      06/19/2017,F/U N/A    MASTECTOMY SIMPLE      1990,Right    OTHER SURGICAL HISTORY      9/30/13,737538,OTHER,left index finger, [facility]    RELEASE CARPAL TUNNEL      2005,Right    RELEASE CARPAL TUNNEL      2010,Left       Review of Systems     OBJECTIVE:     /72   Pulse 94   Temp 97.7  F (36.5  C)   Resp 22   Wt 52.9 kg (116 lb 9.6 oz)   LMP  (LMP Unknown)   SpO2 99%   BMI 19.40 kg/m    Body mass index is 19.4 kg/m .  Physical Exam  Constitutional:       Appearance: Normal appearance.   Cardiovascular:      Rate and Rhythm: Normal rate and regular rhythm.      Heart sounds: No murmur heard.     Comments:  Palpation of the chest does not reproduce the pain.  Pulmonary:      Effort: Pulmonary effort is normal.      Breath sounds: Normal breath sounds.   Neurological:      Mental Status: She is alert.         Diagnostic Test Results:  Results for orders placed or performed in visit on 11/30/23 (from the past 24 hour(s))   D dimer quantitative   Result Value Ref Range    D-Dimer Quantitative 0.53 (H) 0.00 - 0.50 ug/mL FEU    Narrative    This D-dimer assay is intended for use in conjunction with a clinical pretest probability assessment model to exclude pulmonary embolism (PE) and deep venous thrombosis (DVT) in outpatients suspected of PE or DVT. The cut-off value is 0.50 ug/mL FEU.    For patients 50 years of age or older, the application of age-adjusted cut-off values for D-Dimer may increase the specificity without significant effect on sensitivity. The literature suggested calculation age adjusted cut-off in ug/L = age in years x 10 ug/L. The results in this laboratory are reported as ug/mL rather than ug/L. The calculation for age adjusted cut off in ug/mL= age in years x 0.01 ug/mL. For example, the cut off for a 76 year old male is 76 x 0.01 ug/mL = 0.76 ug/mL (760 ug/L).    M Chica et al. Age adjusted D-dimer cut-off levels to rule out pulmonary embolism: The ADJUST-PE Study. ALFRED 2014;311:0347-4585.; HJ Becky et al. Diagnostic accuracy of conventional or age adjusted D-dimer cutoff values in older patients with suspected venous thromboembolism. Systemic review and meta-analysis. BMJ 2013:346:f2492.   Extra Tube    Narrative    The following orders were created for panel order Extra Tube.  Procedure                               Abnormality         Status                     ---------                               -----------         ------                     Extra Serum Separator Tu...[[id number]]                      In process                   Please view results for these tests on the individual  orders.   Extra Tube    Narrative    The following orders were created for panel order Extra Tube.  Procedure                               Abnormality         Status                     ---------                               -----------         ------                     Extra Green Top (Lithium...[[id number]]                      In process                 Extra Purple Top Tube[[id number]]                            In process                   Please view results for these tests on the individual orders.       ASSESSMENT/PLAN:         (R07.9) Chest pain, unspecified type  (primary encounter diagnosis)  Comment: X-ray does show some fibrosis.  Otherwise unremarkable.  D-dimer was normal adjusted for age.  Plan: XR Chest 2 Views, D dimer quantitative        Likely pleuritic chest pain.    Recommended continuing ibuprofen.  I did offer prednisone.  But patient would like to stay with the ibuprofen.  Follow-up if getting worse.  Doubt a DVT.  Or cardiac in origin.    Feng Doran MD  [facility]

## 2023-11-30 NOTE — NURSING NOTE
Patient here for right shoulder back pain that started 3 days prior. She has been using ibuprofen, ice and heat. She does have SOB. Medication Reconciliation: complete.    Nettie Tan LPN  11/30/2023 9:16 AM

## 2024-06-15 ENCOUNTER — OFFICE VISIT (OUTPATIENT)
Dept: FAMILY MEDICINE | Facility: OTHER | Age: 84
End: 2024-06-15
Attending: NURSE PRACTITIONER
Payer: COMMERCIAL

## 2024-06-15 VITALS
RESPIRATION RATE: 20 BRPM | BODY MASS INDEX: 18.99 KG/M2 | HEIGHT: 65 IN | TEMPERATURE: 99.1 F | SYSTOLIC BLOOD PRESSURE: 130 MMHG | OXYGEN SATURATION: 100 % | HEART RATE: 90 BPM | DIASTOLIC BLOOD PRESSURE: 64 MMHG | WEIGHT: 114 LBS

## 2024-06-15 DIAGNOSIS — B02.9 HERPES ZOSTER WITHOUT COMPLICATION: Primary | ICD-10-CM

## 2024-06-15 DIAGNOSIS — R05.8 PRODUCTIVE COUGH: ICD-10-CM

## 2024-06-15 PROCEDURE — 99213 OFFICE O/P EST LOW 20 MIN: CPT | Performed by: NURSE PRACTITIONER

## 2024-06-15 PROCEDURE — G0463 HOSPITAL OUTPT CLINIC VISIT: HCPCS

## 2024-06-15 RX ORDER — VALACYCLOVIR HYDROCHLORIDE 1 G/1
1000 TABLET, FILM COATED ORAL 3 TIMES DAILY
Qty: 21 TABLET | Refills: 0 | Status: SHIPPED | OUTPATIENT
Start: 2024-06-15 | End: 2024-06-22

## 2024-06-15 RX ORDER — AMOXICILLIN 875 MG
875 TABLET ORAL 2 TIMES DAILY
Qty: 14 TABLET | Refills: 0 | Status: SHIPPED | OUTPATIENT
Start: 2024-06-15 | End: 2024-06-22

## 2024-06-15 ASSESSMENT — PAIN SCALES - GENERAL: PAINLEVEL: MILD PAIN (3)

## 2024-06-15 NOTE — NURSING NOTE
"Chief Complaint   Patient presents with    Derm Problem     X 3 days    Cough     Lehman phlegm this am    Hoarse     X 5 days     Patient did have one shingles shot  Tx rash under breast with some hand cream without relief    Patient states she didn't sleep last night due to painful rash. 10/10 pain    Initial /64 (BP Location: Right arm, Patient Position: Sitting, Cuff Size: Adult Regular)   Pulse 90   Temp 99.1  F (37.3  C) (Tympanic)   Resp 20   Ht 1.651 m (5' 5\")   Wt 51.7 kg (114 lb)   LMP  (LMP Unknown)   SpO2 100%   BMI 18.97 kg/m   Estimated body mass index is 18.97 kg/m  as calculated from the following:    Height as of this encounter: 1.651 m (5' 5\").    Weight as of this encounter: 51.7 kg (114 lb).     Advance Care Directive on file? yes    FOOD SECURITY SCREENING QUESTIONS:    The next two questions are to help us understand your food security.  If you are feeling you need any assistance in this area, we have resources available to support you today.    Hunger Vital Signs:  Within the past 12 months we worried whether our food would run out before we got money to buy more. Never  Within the past 12 months the food we bought just didn't last and we didn't have money to get more. Never  Tanya Miller LPN,[facility] on 6/15/2024 at 9:17 AM      Tanya Miller LPN     "

## 2024-06-15 NOTE — PROGRESS NOTES
ASSESSMENT/PLAN:     I have reviewed the nursing notes.  I have reviewed the findings, diagnosis, plan and need for follow up with the patient.        1. Herpes zoster without complication    - valACYclovir (VALTREX) 1000 mg tablet; Take 1 tablet (1,000 mg) by mouth 3 times daily for 7 days  Dispense: 21 tablet; Refill: 0    Acute left sided very painful rash consistent with shingles, present for 3 days.  Start antiviral treatment.     Symptomatic treatment:  OTC Lidocaine patches, Tylenol, Ibuprofen, cool compresses, etc     Discussed warning signs/symptoms indicative of need to f/u  Follow up if symptoms persist or worsen or concerns    2. Productive cough    - amoxicillin (AMOXIL) 875 MG tablet; Take 1 tablet (875 mg) by mouth 2 times daily for 7 days  Dispense: 14 tablet; Refill: 0    Progressively worsening acute productive cough in frail elderly.  Start Amoxicillin.  May use OTC cough medication such as Delsym, Robitussin, Coricidin, or Mucinex PRN  May use symptomatic care with tylenol or ibuprofen.   Symptomatic treatment -   Encouraged fluids   Using a humidifier works well to break up the congestion.   Elevate the mattress to 15 degrees in order to help with the congestion.  Frequent swallows of cool liquid.    Oatmeal or honey coats the throat and some patients find it soothes the pain.   Salt water gargles as needed - if old enough to be appropriate    Discussed warning signs/symptoms indicative of need to f/u  Follow up if symptoms persist or worsen or concerns      I explained my diagnostic considerations and recommendations to the patient, who voiced understanding and agreement with the treatment plan. All questions were answered. We discussed potential side effects of any prescribed or recommended therapies, as well as expectations for response to treatments.    Sury Alvarenga NP  [facility]      SUBJECTIVE:   Bethany Hoyt is a 83 year old female who presents to clinic  today for the following health issues:  Rash    HPI  Painful rash x 3 days on left side of back wrapping around the left breast.  She has had one dose of shingles vaccine in 2009. She did have chicken pox as a child.  Rash is not pruritic.  No fevers.    Congested productive cough the past 3 days.  Phlegm is gray this morning.   Mildly winded with exertion.  Mild fatigue.  Nasal drainage/congestion started today.  No sore throat.  Hoarse voice the past 5 days.  No headaches, dizziness or lightheadedness.  Appetite is minimal.  No use of OTC.        Past Medical History:   Diagnosis Date    Acquired absence of right breast and nipple     No Comments Provided    Bilateral carpal tunnel syndrome     No Comments Provided    Contact dermatitis     1/24/2011    Disease of pericardium     No Comments Provided    Disorder of cartilage     1/24/2011    Diverticulosis of intestine without perforation or abscess without bleeding     No Comments Provided    Diverticulosis of large intestine without perforation or abscess without bleeding     3/26/2012    History of colonic polyps     3/27/2012    Localized swelling, mass and lump, left upper limb     9/27/2013    Malignant neoplasm of female breast (H)     1990,estrogen receptor borderline, progesterone receptor positive, treated with Tamoxifen 10 mg. b.i.d. times five years.  Status post chemotherapy times one and Tamoxifen times five years.    Osteoarthritis of hip     10/7/2011    Personal history of malignant neoplasm of breast     1/24/2011     Past Surgical History:   Procedure Laterality Date    ARTHROPLASTY HIP ANTERIOR Left 12/1/2020    Procedure: ARTHROPLASTY, HIP, TOTAL, DIRECT ANTERIOR APPROACH;  Surgeon: Amrik Lu MD;  Location: [facility] OR    AS TOTAL HIP ARTHROPLASTY Right 2015    BACK SURGERY      1959    COLONOSCOPY      5/2002,Diverticulosis    COLONOSCOPY      3/26/2012,Follow up 2017    COLONOSCOPY      06/19/2017,F/U N/A    MASTECTOMY SIMPLE       "1990,Right    OTHER SURGICAL HISTORY      9/30/13,[id number],OTHER,left index finger, [facility]    RELEASE CARPAL TUNNEL      2005,Right    RELEASE CARPAL TUNNEL      2010,Left     Social History     Tobacco Use    Smoking status: Never    Smokeless tobacco: Never   Substance Use Topics    Alcohol use: No     Current Outpatient Medications   Medication Sig Dispense Refill    acetaminophen (TYLENOL) 500 MG tablet Take 500-1,000 mg by mouth every 6 hours as needed for mild pain      ascorbic acid (VITAMIN C) 500 MG tablet Take 500 mg by mouth daily      calcium carbonate-vitamin D (OS-CARTER) 500-400 MG-UNIT tablet Take 1 tablet by mouth daily      ibuprofen (ADVIL/MOTRIN) 200 MG capsule Take 400 mg by mouth At Bedtime       Multiple Vitamins-Minerals (MULTI COMPLETE PO) Take 1 tablet by mouth daily      vitamin E (TOCOPHEROL) 400 units (180 mg) capsule Take 400 Units by mouth daily       No Known Allergies      Past medical history, past surgical history, current medications and allergies reviewed and accurate to the best of my knowledge.        OBJECTIVE:     /64 (BP Location: Right arm, Patient Position: Sitting, Cuff Size: Adult Regular)   Pulse 90   Temp 99.1  F (37.3  C) (Tympanic)   Resp 20   Ht 1.651 m (5' 5\")   Wt 51.7 kg (114 lb)   LMP  (LMP Unknown)   SpO2 100%   BMI 18.97 kg/m    Body mass index is 18.97 kg/m .        Physical Exam  General Appearance: frail but non acutely ill appearing elderly female, appropriate appearance for age. No acute distress  Orophayrnx: moist mucous membranes, pharynx without erythema, tonsils without hypertrophy, tonsils without erythema, no tonsillar exudates, no oral lesions, no palate petechiae,post nasal drip seen, no trismus, voice clear.    Nose:  congestion with drainage present  Neck: supple without adenopathy  Respiratory: normal chest wall and respirations.  Normal effort.  Clear to auscultation bilaterally, no wheezing, crackles or rhonchi.  No increased work " of breathing.  Occasional congested cough appreciated.  Cardiac: RRR with no murmurs  Musculoskeletal:  Equal movement of bilateral upper extremities.  Equal movement of bilateral lower extremities.  Normal gait.    Dermatological:  left side of thoracic back wrapping around under the left breast with erythematous based patches of rash with over laying vesicles in dermatome pattern.    Psychological: normal affect, alert, oriented, and pleasant.            +ROGELIO esparza RN and Dr. Garrett/Physician/Nursing +[facility] aide/Physician/Nursing +[facility] aide/Physician/Nursing/Patient

## 2024-06-15 NOTE — PATIENT INSTRUCTIONS
Shingles rash - take Valacyclovir capsules 3 times a day for 7 days   over the counter Lidocaine or pain relief patches and apply to affected areas of rash/pain    Cough - take Amoxicillin twice daily for 7 days  Most coughs are caused by a viral infection.   Usually coughs can last 2 to 3 weeks.   Most sore throats are caused by viruses and are part of a cold. About 10% of sore throats are caused by strep bacteria.  Encouraged fluids and rest.  May use symptomatic care with tylenol or ibuprofen.   Using a humidifier works well to break up the congestion.   Elevate the mattress to 15 degrees in order to help with the congestion.  Frequent swallows of cool liquid.    Oatmeal or honey coats the throat and some patients find it soothes the pain.   Salt water gargles as needed - if old enough to be appropriate  Return to clinic with change/worsening of symptoms or concerns.

## 2024-11-15 ENCOUNTER — OFFICE VISIT (OUTPATIENT)
Dept: FAMILY MEDICINE | Facility: OTHER | Age: 84
End: 2024-11-15
Attending: NURSE PRACTITIONER
Payer: MEDICARE

## 2024-11-15 VITALS
RESPIRATION RATE: 16 BRPM | WEIGHT: 113 LBS | HEIGHT: 65 IN | OXYGEN SATURATION: 95 % | HEART RATE: 78 BPM | BODY MASS INDEX: 18.83 KG/M2 | DIASTOLIC BLOOD PRESSURE: 82 MMHG | TEMPERATURE: 98.3 F | SYSTOLIC BLOOD PRESSURE: 136 MMHG

## 2024-11-15 DIAGNOSIS — L08.9 WOUND INFECTION: Primary | ICD-10-CM

## 2024-11-15 DIAGNOSIS — T14.8XXA WOUND INFECTION: Primary | ICD-10-CM

## 2024-11-15 LAB
GRAM STAIN RESULT: NORMAL
GRAM STAIN RESULT: NORMAL

## 2024-11-15 PROCEDURE — 87205 SMEAR GRAM STAIN: CPT | Mod: ZL | Performed by: PHYSICIAN ASSISTANT

## 2024-11-15 PROCEDURE — G0463 HOSPITAL OUTPT CLINIC VISIT: HCPCS

## 2024-11-15 PROCEDURE — 250N000009 HC RX 250: Performed by: PHYSICIAN ASSISTANT

## 2024-11-15 RX ORDER — GINSENG 100 MG
1 CAPSULE ORAL ONCE
Status: COMPLETED | OUTPATIENT
Start: 2024-11-15 | End: 2024-11-15

## 2024-11-15 RX ORDER — CEPHALEXIN 500 MG/1
500 CAPSULE ORAL 3 TIMES DAILY
Qty: 21 CAPSULE | Refills: 0 | Status: SHIPPED | OUTPATIENT
Start: 2024-11-15 | End: 2024-11-22

## 2024-11-15 RX ADMIN — BACITRACIN ZINC 1 G: 500 OINTMENT TOPICAL at 10:35

## 2024-11-15 ASSESSMENT — PAIN SCALES - GENERAL: PAINLEVEL_OUTOF10: NO PAIN (0)

## 2024-11-15 NOTE — PROGRESS NOTES
"ASSESSMENT/PLAN:     I have reviewed the nursing notes.  I have reviewed the findings, diagnosis, plan and need for follow up with the patient.    Bethany was seen today for left thumb pain. On exam - painful white bump with suggestion for surrounding infection. This area was cleaned with alcohol, area opened with an 11 blade. Purulent drainage obtained. Wound culture obtained, pending.  Wound was dressed with antibiotic ointment and bandage.     Diagnoses and all orders for this visit:    Wound infection  -     cephALEXin (KEFLEX) 500 MG capsule; Take 1 capsule (500 mg) by mouth 3 times daily for 7 days.  -     Abscess Aerobic Bacterial Culture Routine With Gram Stain  -     bacitracin ointment 1 g       Complete antibiotic for 5-7 days  Symptomatic treatments per AVS  Return to clinic if symptoms persist/worsen        I explained my diagnostic considerations and recommendations to the patient, who voiced understanding and agreement with the treatment plan. All questions were answered. We discussed potential side effects of any prescribed or recommended therapies, as well as expectations for response to treatments.    Le Morris PA-C  [facility]          Nursing Notes:   Tanya Miller LPN  11/15/2024 10:03 AM  Signed  Chief Complaint   Patient presents with    Hand Problem     L thumb - wart removal     Patient would like removal today    Initial /82 (BP Location: Right arm, Patient Position: Sitting, Cuff Size: Adult Regular)   Pulse 78   Temp 98.3  F (36.8  C) (Tympanic)   Resp 16   Ht 1.651 m (5' 5\")   Wt 51.3 kg (113 lb)   LMP  (LMP Unknown)   SpO2 95%   BMI 18.80 kg/m   Estimated body mass index is 18.8 kg/m  as calculated from the following:    Height as of this encounter: 1.651 m (5' 5\").    Weight as of this encounter: 51.3 kg (113 lb).     Advance Care Directive on file? y    FOOD SECURITY SCREENING QUESTIONS:    The next two questions are to help us understand your " food security.  If you are feeling you need any assistance in this area, we have resources available to support you today.    Hunger Vital Signs:  Within the past 12 months we worried whether our food would run out before we got money to buy more. Never  Within the past 12 months the food we bought just didn't last and we didn't have money to get more. 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     No Comments Provided    Bilateral carpal tunnel syndrome     No Comments Provided    Contact dermatitis     1/24/2011    Disease of pericardium     No Comments Provided    Disorder of cartilage     1/24/2011    Diverticulosis of intestine without perforation or abscess without bleeding     No Comments Provided    Diverticulosis of large intestine without perforation or abscess without bleeding     3/26/2012    History of colonic polyps     3/27/2012    Localized swelling, mass and lump, left upper limb     9/27/2013    Malignant neoplasm of female breast (H)     1990,estrogen receptor borderline, progesterone receptor positive, treated with Tamoxifen 10 mg. b.i.d. times five years.  Status post chemotherapy times one and Tamoxifen times five years.    Osteoarthritis of hip     10/7/2011    Personal history of malignant neoplasm of breast     1/24/2011     Past Surgical History:   Procedure Laterality Date    ARTHROPLASTY HIP ANTERIOR Left 12/1/2020    Procedure: ARTHROPLASTY, HIP, TOTAL, DIRECT ANTERIOR APPROACH;  Surgeon: Amrik Lu MD;  Location:  OR    AS  "TOTAL HIP ARTHROPLASTY Right 2015    BACK SURGERY      1959    COLONOSCOPY      5/2002,Diverticulosis    COLONOSCOPY      3/26/2012,Follow up 2017    COLONOSCOPY      06/19/2017,F/U N/A    MASTECTOMY SIMPLE      1990,Right    OTHER SURGICAL HISTORY      9/30/13,690059,OTHER,left index finger, [place]    RELEASE CARPAL TUNNEL      2005,Right    RELEASE CARPAL TUNNEL      2010,Left     Social History     Tobacco Use    Smoking status: Never    Smokeless tobacco: Never   Substance Use Topics    Alcohol use: No     Current Outpatient Medications   Medication Sig Dispense Refill    acetaminophen (TYLENOL) 500 MG tablet Take 500-1,000 mg by mouth every 6 hours as needed for mild pain      ascorbic acid (VITAMIN C) 500 MG tablet Take 500 mg by mouth daily      calcium carbonate-vitamin D (OS-CARTER) 500-400 MG-UNIT tablet Take 1 tablet by mouth daily      ibuprofen (ADVIL/MOTRIN) 200 MG capsule Take 400 mg by mouth At Bedtime       Multiple Vitamins-Minerals (MULTI COMPLETE PO) Take 1 tablet by mouth daily      vitamin E (TOCOPHEROL) 400 units (180 mg) capsule Take 400 Units by mouth daily      valACYclovir (VALTREX) 1000 mg tablet Take 1 tablet (1,000 mg) by mouth 3 times daily for 7 days 21 tablet 0     No Known Allergies      Past medical history, past surgical history, current medications and allergies reviewed and accurate to the best of my knowledge.          OBJECTIVE:     /82 (BP Location: Right arm, Patient Position: Sitting, Cuff Size: Adult Regular)   Pulse 78   Temp 98.3  F (36.8  C) (Tympanic)   Resp 16   Ht 1.651 m (5' 5\")   Wt 51.3 kg (113 lb)   LMP  (LMP Unknown)   SpO2 95%   BMI 18.80 kg/m    Body mass index is 18.8 kg/m .  Physical Exam    General Appearance: Well appearing female,  No acute distress  Eyes: no injection, no tearing or drainage, eye lids normal  Respiratory: normal respiration  Musculoskeletal:  Left thumb. Small white bump, possibly tophaceous gout, with surrounding swelling, " mild erythema, fluctuant      No results found for any visits on 11/15/24.

## 2024-11-15 NOTE — NURSING NOTE
"Chief Complaint   Patient presents with    Hand Problem     L thumb - wart removal     Patient would like removal today    Initial /82 (BP Location: Right arm, Patient Position: Sitting, Cuff Size: Adult Regular)   Pulse 78   Temp 98.3  F (36.8  C) (Tympanic)   Resp 16   Ht 1.651 m (5' 5\")   Wt 51.3 kg (113 lb)   LMP  (LMP Unknown)   SpO2 95%   BMI 18.80 kg/m   Estimated body mass index is 18.8 kg/m  as calculated from the following:    Height as of this encounter: 1.651 m (5' 5\").    Weight as of this encounter: 51.3 kg (113 lb).     Advance Care Directive on file? y    FOOD SECURITY SCREENING QUESTIONS:    The next two questions are to help us understand your food security.  If you are feeling you need any assistance in this area, we have resources available to support you today.    Hunger Vital Signs:  Within the past 12 months we worried whether our food would run out before we got money to buy more. Never  Within the past 12 months the food we bought just didn't last and we didn't have money to get more. Never  Tanya Miller LPN,LPN on 11/15/2024 at 9:39 AM      Tanya Miller LPN     "

## 2024-11-15 NOTE — PATIENT INSTRUCTIONS
Soak it 2-3 x daily for 10-15 minutes  Apply topical antibiotic ointment 2-3 x daily  Cephalexin 1 tablet 3 x daily for 5-7 days  Return to clinic if symptoms persist/worsen

## 2024-11-18 LAB
BACTERIA ABSC ANAEROBE+AEROBE CULT: NORMAL
GRAM STAIN RESULT: NORMAL
GRAM STAIN RESULT: NORMAL

## 2025-04-21 ENCOUNTER — OFFICE VISIT (OUTPATIENT)
Dept: FAMILY MEDICINE | Facility: OTHER | Age: 85
End: 2025-04-21
Payer: MEDICARE

## 2025-04-21 VITALS
DIASTOLIC BLOOD PRESSURE: 80 MMHG | OXYGEN SATURATION: 97 % | WEIGHT: 111.2 LBS | HEIGHT: 65 IN | SYSTOLIC BLOOD PRESSURE: 138 MMHG | BODY MASS INDEX: 18.53 KG/M2 | TEMPERATURE: 97.1 F | RESPIRATION RATE: 18 BRPM | HEART RATE: 68 BPM

## 2025-04-21 DIAGNOSIS — L84 CORN OR CALLUS: ICD-10-CM

## 2025-04-21 DIAGNOSIS — B07.0 PLANTAR WARTS: ICD-10-CM

## 2025-04-21 DIAGNOSIS — S91.209A TRAUMATIC AVULSION OF NAIL PLATE OF TOE, INITIAL ENCOUNTER: Primary | ICD-10-CM

## 2025-04-21 PROCEDURE — G0463 HOSPITAL OUTPT CLINIC VISIT: HCPCS | Mod: 25

## 2025-04-21 PROCEDURE — 11750 EXCISION NAIL&NAIL MATRIX: CPT | Performed by: NURSE PRACTITIONER

## 2025-04-21 PROCEDURE — 250N000009 HC RX 250: Mod: JZ | Performed by: NURSE PRACTITIONER

## 2025-04-21 PROCEDURE — 17110 DESTRUCTION B9 LES UP TO 14: CPT | Performed by: NURSE PRACTITIONER

## 2025-04-21 PROCEDURE — 96372 THER/PROPH/DIAG INJ SC/IM: CPT | Performed by: NURSE PRACTITIONER

## 2025-04-21 RX ORDER — LIDOCAINE HYDROCHLORIDE 10 MG/ML
4 INJECTION, SOLUTION EPIDURAL; INFILTRATION; INTRACAUDAL; PERINEURAL ONCE
Status: COMPLETED | OUTPATIENT
Start: 2025-04-21 | End: 2025-04-21

## 2025-04-21 RX ADMIN — LIDOCAINE HYDROCHLORIDE 4 ML: 10 INJECTION, SOLUTION EPIDURAL; INFILTRATION; INTRACAUDAL; PERINEURAL at 13:25

## 2025-04-21 ASSESSMENT — ENCOUNTER SYMPTOMS
CONSTITUTIONAL NEGATIVE: 1
JOINT SWELLING: 1
PSYCHIATRIC NEGATIVE: 1
HEMATOLOGIC/LYMPHATIC NEGATIVE: 1
RESPIRATORY NEGATIVE: 1
GASTROINTESTINAL NEGATIVE: 1
EYES NEGATIVE: 1
CARDIOVASCULAR NEGATIVE: 1

## 2025-04-21 ASSESSMENT — PAIN SCALES - GENERAL: PAINLEVEL_OUTOF10: MODERATE PAIN (5)

## 2025-04-21 NOTE — PATIENT INSTRUCTIONS
Remove dressing in 24 hours  Use the gauze and tape to re-wrap the toe  Apply new Band-Aid to wart on bottom of foot  Use corn pad for corn on right foot top of toe

## 2025-04-21 NOTE — PROGRESS NOTES
Bethany Hoyt  1940    ASSESSMENT/PLAN      Presents to rapid clinic with multiple concerns, patient has a corn on the top of her fourth digit of her right foot, patient has a large plantar wart on the bottom of her right foot and has a left great toenail that is been painful and loose and nearly falling off.  Corn was partially shaved, plantars wart was treated as below, left great toenail was removed as below.  Patient's vitals are stable and she appears nontoxic.        1. Traumatic avulsion of nail plate of toe, initial encounter (Primary)  - lidocaine (PF) (XYLOCAINE) 1 % injection 4 mL  - REMOVAL NAIL/NAIL BED, PARTIAL OR COMPLETE - see below      2. Plantar warts  - DESTRUCT BENIGN LESION, UP TO 14    A non-erythematous, raised papule with pinpoint hemmorhages measuring 4mm is seen on the bottom of right foot.  Plantar wart was frozen easily three times with liquid nitrogen after manual curettage.  A total of 1 warts are treated today.  The etiology of common warts were discussed.  Bethany will continue to use the over-the-counter medications such as Compound W under occlusion on a nightly  basis.  Warm soapy water soaks and sanding also recommended.       3. Corn or callus  - Corn was shaved down to healthy skin, dry skin removed on top of right foot, 4th digit  - Recommend corn pad for protection  - May use over-the-counter Tylenol or ibuprofen PRN  - Follow up as needed for new or worsening symptoms      Procedure note Great Toe Nail Removal, left foot   Verbal consent obtained  Risks and benefits discussed.   ChloraPrep used to cleanse the left great toenail.  Total of 2.5 cc 1% Bupivacaine without epi was used to numb the surrounding area. An toenail  was used to lift and loosen the lateral side of the toenail.  A toenail scissor was used to cut the toenail in half and a clamp was used to pull and remove the toenail wedge without complication.    Patient tolerated the procedure well. No  complications were appreciated.  Ointment was applied and toe was bandaged with gauze and tape.  Remove bandaging tomorrow and redress as needed until healed           *A shared decision making model was used.   *Patient and/or associated parties understood and were agreeable to treatment plan.   *Plan and all results were discussed.   *Explanation of diagnosis, treatment options and risk and benefits of medications reviewed with patient.    *Time was taken to answer all questions.   *Red flags symptoms were discussed and patient was advised when they should return for reevaluation or for prompt emergency evaluation.   *Patient was given verbal and written instructions on plan of care. Instructions were printed or are available on Mychart on electronic AVS.   *We discussed potential side effects of any prescribed or recommended therapies, as well as expectations for response to treatments.  *Patient discharged in stable condition    Joanna Alfaro CNP  [facility]    SUBJECTIVE  CHIEF COMPLAINT/ REASON FOR VISIT  Patient presents with:  Foot Problems: Planter wart-toenail falling off-[place]     HISTORY OF PRESENT ILLNESS  Bethany Hoyt is a pleasant 84 year old female presents to rapid clinic today with multiple concerns, patient has a corn on the top of her fourth digit of her right foot, patient has a large plantars wart on the bottom of her right foot and has a left great toenail that is been painful and loose and nearly falling off..       I have reviewed the nursing notes.  I have reviewed allergies, medication list, problem list, and past medical history.    REVIEW OF SYSTEMS  Review of Systems   Constitutional: Negative.    HENT: Negative.     Eyes: Negative.    Respiratory: Negative.     Cardiovascular: Negative.    Gastrointestinal: Negative.    Genitourinary: Negative.    Musculoskeletal:  Positive for joint swelling.   Skin:         Callus, wart, toenail pain   Hematological:  "Negative.    Psychiatric/Behavioral: Negative.     All other systems reviewed and are negative.       VITAL SIGNS  Vitals:    04/21/25 1153   BP: 138/80   BP Location: Right arm   Patient Position: Sitting   Cuff Size: Adult Regular   Pulse: 68   Resp: 18   Temp: 97.1  F (36.2  C)   TempSrc: Temporal   SpO2: 97%   Weight: 50.4 kg (111 lb 3.2 oz)   Height: 1.651 m (5' 5\")      Body mass index is 18.5 kg/m .      OBJECTIVE  PHYSICAL EXAM  Physical Exam  Vitals and nursing note reviewed.   HENT:      Head: Normocephalic.      Nose: Nose normal.   Eyes:      Pupils: Pupils are equal, round, and reactive to light.   Cardiovascular:      Rate and Rhythm: Normal rate and regular rhythm.      Pulses: Normal pulses.      Heart sounds: Normal heart sounds.   Pulmonary:      Effort: Pulmonary effort is normal.      Breath sounds: Normal breath sounds.   Abdominal:      Palpations: Abdomen is soft.   Musculoskeletal:         General: Normal range of motion.      Cervical back: Normal range of motion.        Feet:    Skin:     General: Skin is warm and dry.      Capillary Refill: Capillary refill takes less than 2 seconds.   Neurological:      General: No focal deficit present.        "

## 2025-04-21 NOTE — NURSING NOTE
"Chief Complaint   Patient presents with    Foot Problems     Planter wart-toenail falling off-[place]       Initial /80 (BP Location: Right arm, Patient Position: Sitting, Cuff Size: Adult Regular)   Pulse 68   Temp 97.1  F (36.2  C) (Temporal)   Resp 18   Ht 1.651 m (5' 5\")   Wt 50.4 kg (111 lb 3.2 oz)   LMP  (LMP Unknown)   SpO2 97%   BMI 18.50 kg/m   Estimated body mass index is 18.5 kg/m  as calculated from the following:    Height as of this encounter: 1.651 m (5' 5\").    Weight as of this encounter: 50.4 kg (111 lb 3.2 oz).  Medication Review: complete    The next two questions are to help us understand your food security.  If you are feeling you need any assistance in this area, we have resources available to support you today.           No data to display                  Health Care Directive:  Patient has a Health Care Directive on file      Harini Grover LPN      "

## 2025-05-08 PROBLEM — Z78.9 DNI (DO NOT INTUBATE): Chronic | Status: ACTIVE | Noted: 2025-05-08

## 2025-05-08 PROBLEM — Z66 DNR (DO NOT RESUSCITATE): Chronic | Status: ACTIVE | Noted: 2025-05-08

## 2025-05-08 PROBLEM — Z66 DNR (DO NOT RESUSCITATE): Status: ACTIVE | Noted: 2025-05-08

## 2025-05-08 PROBLEM — Z78.9 DNI (DO NOT INTUBATE): Status: ACTIVE | Noted: 2025-05-08

## 2025-05-12 ENCOUNTER — DOCUMENTATION ONLY (OUTPATIENT)
Dept: OTHER | Facility: CLINIC | Age: 85
End: 2025-05-12
Payer: COMMERCIAL

## 2025-06-15 ENCOUNTER — HOSPITAL ENCOUNTER (OUTPATIENT)
Dept: GENERAL RADIOLOGY | Facility: OTHER | Age: 85
Discharge: HOME | End: 2025-06-15
Attending: STUDENT IN AN ORGANIZED HEALTH CARE EDUCATION/TRAINING PROGRAM
Payer: MEDICARE

## 2025-06-15 ENCOUNTER — OFFICE VISIT (OUTPATIENT)
Dept: FAMILY MEDICINE | Facility: OTHER | Age: 85
End: 2025-06-15
Payer: MEDICARE

## 2025-06-15 VITALS
SYSTOLIC BLOOD PRESSURE: 123 MMHG | WEIGHT: 112.2 LBS | TEMPERATURE: 97.8 F | OXYGEN SATURATION: 98 % | BODY MASS INDEX: 18.67 KG/M2 | DIASTOLIC BLOOD PRESSURE: 71 MMHG | HEART RATE: 64 BPM

## 2025-06-15 DIAGNOSIS — S60.021A CONTUSION OF RIGHT INDEX FINGER WITHOUT DAMAGE TO NAIL, INITIAL ENCOUNTER: ICD-10-CM

## 2025-06-15 DIAGNOSIS — S60.021A CONTUSION OF RIGHT INDEX FINGER WITHOUT DAMAGE TO NAIL, INITIAL ENCOUNTER: Primary | ICD-10-CM

## 2025-06-15 PROCEDURE — 73140 X-RAY EXAM OF FINGER(S): CPT | Mod: 26 | Performed by: RADIOLOGY

## 2025-06-15 PROCEDURE — 3074F SYST BP LT 130 MM HG: CPT | Performed by: STUDENT IN AN ORGANIZED HEALTH CARE EDUCATION/TRAINING PROGRAM

## 2025-06-15 PROCEDURE — 73140 X-RAY EXAM OF FINGER(S): CPT | Mod: RT

## 2025-06-15 PROCEDURE — G0463 HOSPITAL OUTPT CLINIC VISIT: HCPCS

## 2025-06-15 PROCEDURE — G0463 HOSPITAL OUTPT CLINIC VISIT: HCPCS | Mod: 25

## 2025-06-15 PROCEDURE — 99213 OFFICE O/P EST LOW 20 MIN: CPT | Performed by: STUDENT IN AN ORGANIZED HEALTH CARE EDUCATION/TRAINING PROGRAM

## 2025-06-15 PROCEDURE — 3078F DIAST BP <80 MM HG: CPT | Performed by: STUDENT IN AN ORGANIZED HEALTH CARE EDUCATION/TRAINING PROGRAM

## 2025-06-15 NOTE — PATIENT INSTRUCTIONS
Bruising on finger     X-ray imaging is reassuring today.    No evidence of infection at this time.    Recommend that you try heat alternating with ice.    Continue to monitor.    Follow-up with primary care in 2 to 4 weeks if it does continue to persist.    Return to rapid clinic or ER if symptoms worsen or change.

## 2025-06-15 NOTE — NURSING NOTE
"Chief Complaint   Patient presents with    Musculoskeletal Problem     Patient has black and blue right pointer finger. She doesn't remember any injuries.   Initial /71   Pulse 64   Temp 97.8  F (36.6  C) (Tympanic)   Wt 50.9 kg (112 lb 3.2 oz)   LMP  (LMP Unknown)   SpO2 98%   BMI 18.67 kg/m   Estimated body mass index is 18.67 kg/m  as calculated from the following:    Height as of 4/21/25: 1.651 m (5' 5\").    Weight as of this encounter: 50.9 kg (112 lb 3.2 oz).  Medication Review: complete    The next two questions are to help us understand your food security.  If you are feeling you need any assistance in this area, we have resources available to support you today.          6/15/2025   SDOH- Food Insecurity   Within the past 12 months, did you worry that your food would run out before you got money to buy more? N   Within the past 12 months, did the food you bought just not last and you didn t have money to get more? N         Health Care Directive:  Patient has a Health Care Directive on file      Estela Suazo MA      "

## 2025-06-15 NOTE — PROGRESS NOTES
Assessment & Plan     (S60.021A) Contusion of right index finger without damage to nail, initial encounter  (primary encounter diagnosis)    Comment: Bruising of the right index finger.  Most likely related to an injury she was unaware of.  No evidence of vascular compromise, x-ray imaging without any noted acute fractures.  She does have chronic arthritic disease.    Plan: XR Finger Right G/E 2 Views        Recommended she use heat on the area, continue to monitor.  If not improving in the next 2 to 4 weeks, recommended evaluation with her primary care provider.  Return to rapid clinic or ER if symptoms were to worsen or change.  She is comfortable and agreeable with this plan.      Tyson Sims is a 84 year old, presenting for the following health issues:  Musculoskeletal Problem    HPI     Patient presents today with concerns of blue and purple color on her right index finger.  She first noticed it a couple weeks ago.  Since then it has been waxing and waning.  She denies any pain with the discoloration.  No numbness or tingling.  No redness.  No skin temperature changes.  She denies any specific injury to the area, though does note she bruises easily.      Review of Systems  Constitutional, HEENT, cardiovascular, pulmonary, gi and gu systems are negative, except as otherwise noted.          Objective    /71   Pulse 64   Temp 97.8  F (36.6  C) (Tympanic)   Wt 50.9 kg (112 lb 3.2 oz)   LMP  (LMP Unknown)   SpO2 98%   BMI 18.67 kg/m    Body mass index is 18.67 kg/m .      Physical Exam   GENERAL: alert and no distress  RESP: no increased work of breathing  MS: Ecchymosis noted on the right index finger on the tip of the finger and then between the PIP and DIP joint sparing the DIP joint, skin temperature the same as the rest of the digits, capillary refill less than 3 seconds, full range of motion, see clinical image                    Signed Electronically by: Erica Reeves PA-C

## 2025-07-28 ENCOUNTER — OFFICE VISIT (OUTPATIENT)
Dept: FAMILY MEDICINE | Facility: OTHER | Age: 85
End: 2025-07-28
Payer: COMMERCIAL

## 2025-07-28 VITALS
SYSTOLIC BLOOD PRESSURE: 138 MMHG | DIASTOLIC BLOOD PRESSURE: 82 MMHG | OXYGEN SATURATION: 97 % | RESPIRATION RATE: 19 BRPM | HEART RATE: 74 BPM | HEIGHT: 65 IN | BODY MASS INDEX: 18.83 KG/M2 | TEMPERATURE: 97.8 F | WEIGHT: 113 LBS

## 2025-07-28 DIAGNOSIS — L84 CORN OR CALLUS: Primary | ICD-10-CM

## 2025-07-28 DIAGNOSIS — L60.8 CHANGE IN NAIL APPEARANCE: ICD-10-CM

## 2025-07-28 PROCEDURE — 3079F DIAST BP 80-89 MM HG: CPT | Performed by: STUDENT IN AN ORGANIZED HEALTH CARE EDUCATION/TRAINING PROGRAM

## 2025-07-28 PROCEDURE — G0463 HOSPITAL OUTPT CLINIC VISIT: HCPCS

## 2025-07-28 PROCEDURE — 3075F SYST BP GE 130 - 139MM HG: CPT | Performed by: STUDENT IN AN ORGANIZED HEALTH CARE EDUCATION/TRAINING PROGRAM

## 2025-07-28 PROCEDURE — 11055 PARING/CUTG B9 HYPRKER LES 1: CPT | Mod: GZ | Performed by: STUDENT IN AN ORGANIZED HEALTH CARE EDUCATION/TRAINING PROGRAM

## 2025-07-28 PROCEDURE — 250N000009 HC RX 250: Performed by: STUDENT IN AN ORGANIZED HEALTH CARE EDUCATION/TRAINING PROGRAM

## 2025-07-28 PROCEDURE — 99213 OFFICE O/P EST LOW 20 MIN: CPT | Mod: 25 | Performed by: STUDENT IN AN ORGANIZED HEALTH CARE EDUCATION/TRAINING PROGRAM

## 2025-07-28 PROCEDURE — 1125F AMNT PAIN NOTED PAIN PRSNT: CPT | Performed by: STUDENT IN AN ORGANIZED HEALTH CARE EDUCATION/TRAINING PROGRAM

## 2025-07-28 RX ORDER — GINSENG 100 MG
1 CAPSULE ORAL ONCE
Status: COMPLETED | OUTPATIENT
Start: 2025-07-28 | End: 2025-07-28

## 2025-07-28 RX ADMIN — BACITRACIN 1 G: 500 OINTMENT TOPICAL at 11:08

## 2025-07-28 ASSESSMENT — PAIN SCALES - GENERAL: PAINLEVEL_OUTOF10: SEVERE PAIN (9)

## 2025-07-28 NOTE — PATIENT INSTRUCTIONS
Right Foot Callous    Pared down today in the office.    No Obvious wart remains.    Topical antibiotic ointment twice a day for one week.    Follow up with podiatry if pain persists.    [facility]    Address: [address], [place] [address]  Phone: [phone]

## 2025-07-28 NOTE — PROGRESS NOTES
"  Assessment & Plan     (L84) Corn or callus  (primary encounter diagnosis)    Comment: persistent callous of foot after wart removal. Area was pared down using 15 blade, small red area in the middle, but does not look like obvious continued wart. No bleeding during removal of callous. Antibiotic ointment and bandage applied over area at this time.    Plan: bacitracin ointment 1 g, Orthopedic          Referral          Follow up with podiatry in 3-4 weeks if not improving. Return to rapid clinic/ER if worsening or changing in the meantime.     (L60.8) Change in nail appearance  Comment: right great toe with movement away from nailbed, fungal appearance.  Plan: Follow up with podiatry for further evaluation of nail.           Tyson Sims is a 84 year old, presenting for the following health issues:  Wart    HPI     Patient presents today with concerns of right foot pain. Notes on the bottom of her foot with stepping on the foot. She notes she was seen in April for wart, which was frozen during that visit. She continues to have discomfort with pressure on the foot. She has continued to use over the counter wart removal creams. She has also been using anti-fungal cream around the nail. The great nail seems to be loosening around the end of the nail.       Review of Systems  Constitutional, HEENT, cardiovascular, pulmonary, gi and gu systems are negative, except as otherwise noted.        Objective    /82 (BP Location: Right arm, Patient Position: Sitting, Cuff Size: Adult Regular)   Pulse 74   Temp 97.8  F (36.6  C) (Temporal)   Resp 19   Ht 1.651 m (5' 5\")   Wt 51.3 kg (113 lb)   LMP  (LMP Unknown)   SpO2 97%   BMI 18.80 kg/m    Body mass index is 18.8 kg/m .    Physical Exam  Musculoskeletal:        Feet:       GENERAL: alert and no distress  RESP: no increased work of breathing  MS: bottom of right foot with callous near the pad at base of great toe that is about 1.5 inch in diameter " with a central area that is more thin/scant bloody area near the center of the callous, no surrounding erythema, edema, slightly tender of the callous. Great toe nail with ability to pull the base of the nail up from the bed, base on nail still attached.           Signed Electronically by: Erica Reeves PA-C

## 2025-07-28 NOTE — PROGRESS NOTES
"Chief Complaint   Patient presents with    Wart   Patient is here for right foot pain.    FOOD SECURITY SCREENING QUESTIONS  Hunger Vital Signs:  Within the past 12 months we worried whether our food would run out before we got money to buy more. Never  Within the past 12 months the food we bought just didn't last and we didn't have money to get more. Never  Nan Choudhury 7/28/2025 10:04 AM      Initial /82 (BP Location: Right arm, Patient Position: Sitting, Cuff Size: Adult Regular)   Pulse 74   Temp 97.8  F (36.6  C) (Temporal)   Resp 19   Ht 1.651 m (5' 5\")   Wt 51.3 kg (113 lb)   LMP  (LMP Unknown)   SpO2 97%   BMI 18.80 kg/m   Estimated body mass index is 18.8 kg/m  as calculated from the following:    Height as of this encounter: 1.651 m (5' 5\").    Weight as of this encounter: 51.3 kg (113 lb).  Medication Reconciliation: complete    Nan Choudhury   "

## (undated) DEVICE — DRAPE IOBAN ISOLATION VERTICAL 320X21CM 6617

## (undated) DEVICE — GLOVE PROTEXIS POWDER FREE SMT 7.5  2D72PT75X

## (undated) DEVICE — ESU SUCTION COAG CAUTERY HAND CONTROL 10FR 6" 30-5200

## (undated) DEVICE — STPL SKIN PRECISE SYSTEM DS-15

## (undated) DEVICE — ADH SKIN CLOSURE PREMIERPRO EXOFIN 1.0ML 3470

## (undated) DEVICE — PACK MAJOR ORTHO SOP15MOFCA

## (undated) DEVICE — PEN MARKING SKIN W/LABELS 31145918

## (undated) DEVICE — SUCTION MANIFOLD NEPTUNE 2 SYS 4 PORT 0702-020-000

## (undated) DEVICE — ESU GROUND PAD ADULT W/CORD E7507

## (undated) DEVICE — DRAPE STERI U 1015

## (undated) DEVICE — COVER LIGHT HANDLE LT-F02

## (undated) DEVICE — PAD FLOOR SURGISAFE 46X40" 84610

## (undated) DEVICE — PAD POLAR UNIV KNEE/SHOULDER LF 02320

## (undated) DEVICE — SU VICRYL 2-0 CT-1 36" UND J945H

## (undated) DEVICE — BLADE SAW OSCIL/SAG STRK 25X90X1.27MM 4125-127-090

## (undated) DEVICE — TUBING SUCTION 10'X3/16" N510

## (undated) DEVICE — PREP CHLORAPREP 26ML TINTED ORANGE  260815

## (undated) DEVICE — SOL WATER 1500ML

## (undated) DEVICE — ESU ELEC BLADE 4" COATED

## (undated) DEVICE — SLEEVE COMPRESSION SCD KNEE MED 74022

## (undated) DEVICE — PACK BASIC SET UP STERILE 88178

## (undated) DEVICE — DRAPE U-SHAPED ORTHOARTS 60X70" 89331

## (undated) DEVICE — DRAPE TOWEL 17X27" BLUE LF DISP 28700-004

## (undated) DEVICE — HOOD T4 PROTECTIVE STERI FACE SHIELD 400-800

## (undated) DEVICE — GLOVE BIOGEL PI INDICATOR 8.0 LF 41680

## (undated) DEVICE — GLOVE PROTEXIS PI ORTHO PF 7.5 2D73HT75

## (undated) DEVICE — Device

## (undated) DEVICE — DRAPE POUCH INSTRUMENT 1018

## (undated) DEVICE — SYSTEM COLD THERAPY POLAR CARE GLACIER 10901

## (undated) DEVICE — ESU BIPOLAR SEALER AQUAMANTYS 6MM 23-112-1

## (undated) DEVICE — DRSG AQUACEL AG 3.5X12" HYDROFIBER 420670

## (undated) DEVICE — DRAPE C-ARM PACK 9"

## (undated) DEVICE — DRAPE U SHEET SPLIT W/TAPE 89079

## (undated) DEVICE — SU VICRYL 1 CT-1 36" J347H

## (undated) RX ORDER — FENTANYL CITRATE-0.9 % NACL/PF 10 MCG/ML
PLASTIC BAG, INJECTION (ML) INTRAVENOUS
Status: DISPENSED
Start: 2020-12-01

## (undated) RX ORDER — ACETAMINOPHEN 325 MG/1
TABLET ORAL
Status: DISPENSED
Start: 2020-12-01

## (undated) RX ORDER — PROPOFOL 10 MG/ML
INJECTION, EMULSION INTRAVENOUS
Status: DISPENSED
Start: 2020-12-01

## (undated) RX ORDER — LIDOCAINE HYDROCHLORIDE 10 MG/ML
INJECTION, SOLUTION INFILTRATION; PERINEURAL
Status: DISPENSED
Start: 2023-04-12

## (undated) RX ORDER — SODIUM CHLORIDE, SODIUM LACTATE, POTASSIUM CHLORIDE, CALCIUM CHLORIDE 600; 310; 30; 20 MG/100ML; MG/100ML; MG/100ML; MG/100ML
INJECTION, SOLUTION INTRAVENOUS
Status: DISPENSED
Start: 2020-12-01

## (undated) RX ORDER — LIDOCAINE HYDROCHLORIDE 10 MG/ML
INJECTION, SOLUTION EPIDURAL; INFILTRATION; INTRACAUDAL; PERINEURAL
Status: DISPENSED
Start: 2025-04-21

## (undated) RX ORDER — NEOMYCIN/BACITRACIN/POLYMYXINB 3.5-400-5K
OINTMENT (GRAM) TOPICAL
Status: DISPENSED
Start: 2024-11-15

## (undated) RX ORDER — DEXAMETHASONE SODIUM PHOSPHATE 4 MG/ML
INJECTION, SOLUTION INTRA-ARTICULAR; INTRALESIONAL; INTRAMUSCULAR; INTRAVENOUS; SOFT TISSUE
Status: DISPENSED
Start: 2020-12-01

## (undated) RX ORDER — GINSENG 100 MG
CAPSULE ORAL
Status: DISPENSED
Start: 2025-07-28

## (undated) RX ORDER — TRANEXAMIC ACID 650 MG/1
TABLET ORAL
Status: DISPENSED
Start: 2020-12-01

## (undated) RX ORDER — CEFAZOLIN SODIUM 2 G/100ML
INJECTION, SOLUTION INTRAVENOUS
Status: DISPENSED
Start: 2020-12-01

## (undated) RX ORDER — GLYCOPYRROLATE 0.2 MG/ML
INJECTION, SOLUTION INTRAMUSCULAR; INTRAVENOUS
Status: DISPENSED
Start: 2020-12-01

## (undated) RX ORDER — ONDANSETRON 2 MG/ML
INJECTION INTRAMUSCULAR; INTRAVENOUS
Status: DISPENSED
Start: 2020-12-01

## (undated) RX ORDER — LIDOCAINE HYDROCHLORIDE 10 MG/ML
INJECTION, SOLUTION EPIDURAL; INFILTRATION; INTRACAUDAL; PERINEURAL
Status: DISPENSED
Start: 2020-12-01

## (undated) RX ORDER — LIDOCAINE HYDROCHLORIDE 20 MG/ML
INJECTION, SOLUTION EPIDURAL; INFILTRATION; INTRACAUDAL; PERINEURAL
Status: DISPENSED
Start: 2020-12-01

## (undated) RX ORDER — BUPIVACAINE HYDROCHLORIDE 5 MG/ML
INJECTION, SOLUTION EPIDURAL; INTRACAUDAL
Status: DISPENSED
Start: 2023-04-12

## (undated) RX ORDER — DEXAMETHASONE SODIUM PHOSPHATE 10 MG/ML
INJECTION, SOLUTION INTRAMUSCULAR; INTRAVENOUS
Status: DISPENSED
Start: 2023-04-12

## (undated) RX ORDER — BUPIVACAINE HYDROCHLORIDE 5 MG/ML
INJECTION, SOLUTION EPIDURAL; INTRACAUDAL
Status: DISPENSED
Start: 2020-12-01